# Patient Record
Sex: MALE | Race: WHITE
[De-identification: names, ages, dates, MRNs, and addresses within clinical notes are randomized per-mention and may not be internally consistent; named-entity substitution may affect disease eponyms.]

---

## 2020-08-22 ENCOUNTER — HOSPITAL ENCOUNTER (INPATIENT)
Dept: HOSPITAL 53 - M ED | Age: 50
LOS: 39 days | Discharge: HOME | DRG: 885 | End: 2020-09-30
Attending: PSYCHIATRY & NEUROLOGY | Admitting: PSYCHIATRY & NEUROLOGY
Payer: MEDICARE

## 2020-08-22 VITALS — BODY MASS INDEX: 36.58 KG/M2 | HEIGHT: 72 IN | WEIGHT: 270.07 LBS

## 2020-08-22 VITALS — DIASTOLIC BLOOD PRESSURE: 78 MMHG | SYSTOLIC BLOOD PRESSURE: 142 MMHG

## 2020-08-22 DIAGNOSIS — E66.9: ICD-10-CM

## 2020-08-22 DIAGNOSIS — K59.00: ICD-10-CM

## 2020-08-22 DIAGNOSIS — I10: ICD-10-CM

## 2020-08-22 DIAGNOSIS — R10.32: ICD-10-CM

## 2020-08-22 DIAGNOSIS — F20.9: Primary | ICD-10-CM

## 2020-08-22 DIAGNOSIS — E78.00: ICD-10-CM

## 2020-08-22 DIAGNOSIS — E11.9: ICD-10-CM

## 2020-08-22 DIAGNOSIS — Z79.84: ICD-10-CM

## 2020-08-22 DIAGNOSIS — Z79.899: ICD-10-CM

## 2020-08-22 DIAGNOSIS — E03.9: ICD-10-CM

## 2020-08-22 DIAGNOSIS — R31.9: ICD-10-CM

## 2020-08-22 LAB
APPEARANCE UR: CLEAR
BACTERIA UR QL AUTO: NEGATIVE
BILIRUB UR QL STRIP.AUTO: NEGATIVE
GLUCOSE UR QL STRIP.AUTO: NEGATIVE MG/DL
HGB UR QL STRIP.AUTO: (no result)
KETONES UR QL STRIP.AUTO: NEGATIVE MG/DL
LEUKOCYTE ESTERASE UR QL STRIP.AUTO: NEGATIVE
MUCOUS THREADS URNS QL MICRO: (no result)
NITRITE UR QL STRIP.AUTO: NEGATIVE
PH UR STRIP.AUTO: 6 UNITS (ref 5–9)
PROT UR QL STRIP.AUTO: NEGATIVE MG/DL
RBC # UR AUTO: 11 /HPF (ref 0–3)
SP GR UR STRIP.AUTO: 1.01 (ref 1–1.03)
SQUAMOUS #/AREA URNS AUTO: 0 /HPF (ref 0–6)
UROBILINOGEN UR QL STRIP.AUTO: 0.2 MG/DL (ref 0–2)
WBC #/AREA URNS AUTO: 1 /HPF (ref 0–3)

## 2020-08-22 RX ADMIN — PROPRANOLOL HYDROCHLORIDE SCH MG: 10 TABLET ORAL at 21:00

## 2020-08-22 RX ADMIN — ARIPIPRAZOLE SCH MG: 15 TABLET ORAL at 21:00

## 2020-08-22 RX ADMIN — METFORMIN HYDROCHLORIDE SCH MG: 500 TABLET, EXTENDED RELEASE ORAL at 18:00

## 2020-08-23 VITALS — SYSTOLIC BLOOD PRESSURE: 148 MMHG | DIASTOLIC BLOOD PRESSURE: 98 MMHG

## 2020-08-23 VITALS — SYSTOLIC BLOOD PRESSURE: 138 MMHG | DIASTOLIC BLOOD PRESSURE: 88 MMHG

## 2020-08-23 RX ADMIN — LEVOTHYROXINE SODIUM SCH MCG: 25 TABLET ORAL at 06:24

## 2020-08-23 RX ADMIN — ARIPIPRAZOLE SCH MG: 15 TABLET ORAL at 09:46

## 2020-08-23 RX ADMIN — IBUPROFEN PRN MG: 600 TABLET, FILM COATED ORAL at 07:34

## 2020-08-23 RX ADMIN — LOSARTAN POTASSIUM SCH MG: 25 TABLET, FILM COATED ORAL at 09:51

## 2020-08-23 RX ADMIN — PROPRANOLOL HYDROCHLORIDE SCH MG: 10 TABLET ORAL at 09:52

## 2020-08-23 RX ADMIN — ATORVASTATIN CALCIUM SCH MG: 20 TABLET, FILM COATED ORAL at 09:52

## 2020-08-23 RX ADMIN — ARIPIPRAZOLE SCH MG: 15 TABLET ORAL at 22:07

## 2020-08-23 RX ADMIN — PROPRANOLOL HYDROCHLORIDE SCH MG: 10 TABLET ORAL at 15:26

## 2020-08-23 RX ADMIN — PROPRANOLOL HYDROCHLORIDE SCH MG: 10 TABLET ORAL at 22:08

## 2020-08-23 RX ADMIN — METFORMIN HYDROCHLORIDE SCH MG: 500 TABLET, EXTENDED RELEASE ORAL at 07:04

## 2020-08-23 RX ADMIN — METFORMIN HYDROCHLORIDE SCH MG: 500 TABLET, EXTENDED RELEASE ORAL at 17:03

## 2020-08-23 NOTE — HPEPDOC
[facility] Medical History & Physical


Date of Admission


Aug 23, 2020


Date of Service:  Aug 23, 2020


Primary Care Physician:  A


Attending Physician:  ESTEFANY ALLEN MD





History and Physical


CHIEF COMPLAINT: Medical intake admission by hospitalist service. C/o recent L 

inguinal pain which has since resolved.





HISTORY OF PRESENT ILLNESS: 51 yo with a hx of HTN, hypothyroidism, DM2, 

admitted to [facility]. Hospitalist service consulted for medical intake. 





PAST MEDICAL HISTORY:


1. HTN


2. DM2


3. Hypothyroidism


4. Schizophrenia





PAST SURGICAL HISTORY:


Non contibutory





SOCIAL HISTORY:


denies smoking, eto use





FAMILY HISTORY:


denies





ALLERGIES: Please see below.





REVIEW OF SYSTEMS:


CONSTITUTIONAL: na.


HEENT: na.


CARDIOVASCULAR: na.


RESPIRATORY: na.


GASTROINTESTINAL: na. 


GENITOURINARY: na. C/o L inguinal pain which has resolved.


SKIN: na.


MUSCULOSKELETAL: na.


NEUROLOGICAL: na.


PSYCHIATRIC: na.


ENDOCRINE: na.


HEMATOLOGIC/LYMPHATIC: na.





HOME MEDICATIONS: Please see below. 





PHYSICAL EXAMINATION:


VITAL SIGNS: please see below


GENERAL APPEARANCE: NAD, comfortable standing. 


HEENT: PERRLA.


CARDIOVASCULAR: RRR, normal S1, S2.


LUNGS: CTAB.


ABDOMEN: obese abdomen, BS+, no pain to palpation. L inguinal area shows no 

swelling or masses on bearing down. No overlying skin changes. 


MUSCULOSKELETAL: normal ROM.


EXTREMITIES: no edema.


NEUROLOGICAL: no focal neuro deficits.


PSYCHIATRIC: calm, cooperative.





MICROBIOLOGY: Please see below. 





ASSESSMENT: 51 yo with a hx of HTN, hypothyroidism, DM2, admitted to [facility]. 

Hospitalist service consulted for medical intake. 





PLAN:


1. L inguinal pain: resolved. No hernia, MSK issues identified. Monitor


2. Hematuria on UA (8/22). Denies hx of trauma. Repeat UA. Outpatient urology 

follow up recommended for cystoscopy if hematuria persists. 


3. HTN: c/w home meds losartan 25 mg daily, propranolol 10 mg TID


4. hypothyroid: c/w home meds 25 mcg daily


5. constipation: bowel regimen


6. DM2: metformin. OK to continue if IV contrast imaging not planned.


7. Schizophrenia: per psychiatry





Thank you for the consult. Please re-consult as needed.





Vital Signs





Vital Signs








  Date Time  Temp Pulse Resp B/P (MAP) Pulse Ox O2 Delivery O2 Flow Rate FiO2


 


8/23/20 16:17 97.1 68 20 148/98 (115)    


 


8/23/20 07:43      Room Air  


 


8/22/20 22:59     95   











Laboratory Data


Labs 24H


Laboratory Tests 2


8/22/20 20:04: 


Urine Color YELLOW, Urine Appearance CLEAR, Urine pH 6.0, Urine Specific Gravity

1.010, Urine Protein NEGATIVE, Urine Glucose (Auto)(UA) NEGATIVE, Urine Ketones 

(Auto) NEGATIVE, Urine Blood 3+H, Urine Nitrite NEGATIVE, Urine Bilirubin 

NEGATIVE, Urine Urobilinogen 0.2, Urine Leukocyte Esterase (Auto) NEGATIVE, 

Urine WBC (Auto) 1, Urine RBC (Auto) 11H, Urine Hyaline Casts (Auto) 0, Urine 

Bacteria (Auto) NEGATIVE, Urine Squamous Epithelial Cells 0, Urine Mucus (Auto) 

SMALL, Urine Sperm (Auto)





Home Medications


Scheduled


Aripiprazole (Aripiprazole) 15 Mg Tablet, 15 MG PO BID


Atorvastatin Calcium (Atorvastatin Calcium) 20 Mg Tablet, 20 MG PO DAILY


Haloperidol (Haloperidol) 20 Mg Tablet, 20 MG PO QHS


Levothyroxine Sodium (Levothyroxine Sodium) 25 Mcg Tablet, 25 MCG PO DAILY


Losartan Potassium (Losartan Potassium) 25 Mg Tablet, 25 MG PO DAILY


Metformin HCl (Metformin HCl ER) 500 Mg Tab.er.24h, 500 MG PO BID


Propranolol HCl (Propranolol HCl) 10 Mg Tablet, 10 MG PO TID





Scheduled PRN


Diphenhydramine HCl (Diphenhydramine HCl) 50 Mg Capsule, 50 MG PO TID PRN for 

ANXIETY/AGITATION


Ibuprofen (Ibuprofen) 600 Mg Tablet, 1 MG PO TID PRN for PAIN


Zolpidem Tartrate (Ambien) 10 Mg Tablet, 10 MG PO QHS PRN for sleep





Allergies


Coded Allergies:  


     No Known Allergies (Unverified , 8/22/20)





A-FIB/CHADSVASC


A-FIB History


Current/History of A-Fib/PAF?:  No


Current PO Anticoag Therapy:  No











ESTEFANY ALLEN MD       Aug 23, 2020 20:08

## 2020-08-24 VITALS — DIASTOLIC BLOOD PRESSURE: 88 MMHG | SYSTOLIC BLOOD PRESSURE: 156 MMHG

## 2020-08-24 VITALS — SYSTOLIC BLOOD PRESSURE: 133 MMHG | DIASTOLIC BLOOD PRESSURE: 64 MMHG

## 2020-08-24 RX ADMIN — LEVOTHYROXINE SODIUM SCH MCG: 25 TABLET ORAL at 05:54

## 2020-08-24 RX ADMIN — LOSARTAN POTASSIUM SCH MG: 25 TABLET, FILM COATED ORAL at 09:36

## 2020-08-24 RX ADMIN — METFORMIN HYDROCHLORIDE SCH MG: 500 TABLET, EXTENDED RELEASE ORAL at 09:34

## 2020-08-24 RX ADMIN — ARIPIPRAZOLE SCH MG: 15 TABLET ORAL at 09:34

## 2020-08-24 RX ADMIN — ARIPIPRAZOLE SCH MG: 15 TABLET ORAL at 21:13

## 2020-08-24 RX ADMIN — PROPRANOLOL HYDROCHLORIDE SCH MG: 10 TABLET ORAL at 21:24

## 2020-08-24 RX ADMIN — PROPRANOLOL HYDROCHLORIDE SCH MG: 10 TABLET ORAL at 09:35

## 2020-08-24 RX ADMIN — METFORMIN HYDROCHLORIDE SCH MG: 500 TABLET, EXTENDED RELEASE ORAL at 16:40

## 2020-08-24 RX ADMIN — PROPRANOLOL HYDROCHLORIDE SCH MG: 10 TABLET ORAL at 16:39

## 2020-08-24 RX ADMIN — ATORVASTATIN CALCIUM SCH MG: 20 TABLET, FILM COATED ORAL at 09:34

## 2020-08-24 NOTE — MHIPNPDOC
[facility] Progress Note


Progress Note


DATE OF SERVICE: 8/24/20


Subjective


HPI:


Lowell presents today for a check-in. He states that he was brought in by the 

police and doesnt remember why or how he was brought in. He denies seeing 

anyone for mental health.


Objective


Appearance: Appears to be stated age. Poor hygeine.





Thought Form: Nonlinear and tangential.


Assessment


F20.89 Other schizophrenia


Plan


Continue medications


Attempt to contact outpatient once releases are obtained





Vital Signs





Vital Signs








  Date Time  Temp Pulse Resp B/P (MAP) Pulse Ox O2 Delivery O2 Flow Rate FiO2


 


8/24/20 09:36    133/64    


 


8/24/20 09:35  73      


 


8/24/20 08:40      Room Air  


 


8/24/20 06:38 98.4  14  95   











Current Medications





Current Medications








 Medications


  (Trade)  Dose


 Ordered  Sig/Heather


 Route


 PRN Reason  Start Time


 Stop Time Status Last Admin


Dose Admin


 


 Acetaminophen


  (Tylenol Tab)  650 mg  Q6HP  PRN


 PO


 HEADACHE or DISCOMFORT  8/22/20 18:30


   Cancel  





 


 Al Hydrox/Mg


 Hydrox/Simethicone


  (Mylanta)  30 ml  Q4HP  PRN


 PO


 HEARTBURN/INDIGESTION  8/22/20 18:30


     





 


 Aripiprazole


  (AbiLIFY)  15 mg  BID


 PO


   8/22/20 21:00


    8/24/20 09:34





 


 Atorvastatin


 Calcium


  (Lipitor)  20 mg  DAILY


 PO


   8/23/20 09:00


    8/24/20 09:34





 


 Diphenhydramine


 HCl


  (Benadryl)  50 mg  TID  PRN


 PO


 ANXIETY/AGITATION  8/22/20 20:00


     





 


 Haloperidol


  (Haldol)  20 mg  QHS


 PO


   8/22/20 21:00


    8/23/20 22:07





 


 Home Med


  (Med Rec


 Complete!)    ASDIRECTED


 XX


   8/22/20 19:00


 8/22/20 18:55 DC  





 


 Ibuprofen


  (Advil)  600 mg  TID  PRN


 PO


 PAIN  8/22/20 20:00


    8/23/20 07:34





 


 Levothyroxine


 Sodium


  (Synthroid)  25 mcg  DAILY@0600


 PO


   8/23/20 06:00


    8/24/20 05:54





 


 Losartan Potassium


  (Cozaar)  25 mg  DAILY


 PO


   8/23/20 09:00


    8/24/20 09:36





 


 Magnesium


 Hydroxide


  (Milk Of


 Magnesia)  30 ml  DAILYPRN  PRN


 PO


 CONSTIPATION  8/22/20 18:30


     





 


 Metformin HCl


  (Glucophage Xr)  500 mg  BID@0800,1800


 PO


   8/22/20 18:00


    8/24/20 09:34





 


 Propranolol HCl


  (Inderal)  10 mg  TID


 PO


   8/22/20 21:00


    8/24/20 09:35





 


 Trazodone HCl


  (Desyrel)  50 mg  QHSP  PRN


 PO


 INSOMNIA  8/22/20 18:30


   Cancel  





 


 Zolpidem Tartrate


  (Ambien)  10 mg  QHSP  PRN


 PO


 Insomnia  8/22/20 20:00


    8/23/20 22:06














Allergies


Coded Allergies:  


     No Known Allergies (Unverified , 8/22/20)











DEBRA MARQUEZ DO              Aug 24, 2020 11:38

## 2020-08-25 VITALS — SYSTOLIC BLOOD PRESSURE: 132 MMHG | DIASTOLIC BLOOD PRESSURE: 76 MMHG

## 2020-08-25 VITALS — SYSTOLIC BLOOD PRESSURE: 136 MMHG | DIASTOLIC BLOOD PRESSURE: 73 MMHG

## 2020-08-25 RX ADMIN — PROPRANOLOL HYDROCHLORIDE SCH MG: 10 TABLET ORAL at 16:41

## 2020-08-25 RX ADMIN — ARIPIPRAZOLE SCH MG: 15 TABLET ORAL at 09:24

## 2020-08-25 RX ADMIN — METFORMIN HYDROCHLORIDE SCH MG: 500 TABLET, EXTENDED RELEASE ORAL at 09:21

## 2020-08-25 RX ADMIN — LEVOTHYROXINE SODIUM SCH MCG: 25 TABLET ORAL at 06:03

## 2020-08-25 RX ADMIN — METFORMIN HYDROCHLORIDE SCH MG: 500 TABLET, EXTENDED RELEASE ORAL at 18:23

## 2020-08-25 RX ADMIN — ARIPIPRAZOLE SCH MG: 15 TABLET ORAL at 21:49

## 2020-08-25 RX ADMIN — PROPRANOLOL HYDROCHLORIDE SCH MG: 10 TABLET ORAL at 09:23

## 2020-08-25 RX ADMIN — LOSARTAN POTASSIUM SCH MG: 25 TABLET, FILM COATED ORAL at 09:24

## 2020-08-25 RX ADMIN — ATORVASTATIN CALCIUM SCH MG: 20 TABLET, FILM COATED ORAL at 09:24

## 2020-08-25 RX ADMIN — PROPRANOLOL HYDROCHLORIDE SCH MG: 10 TABLET ORAL at 21:51

## 2020-08-25 NOTE — MHIPNPDOC
[facility] Progress Note


Progress Note


DATE OF SERVICE: 8/25/20


Subjective


HPI:


Attempted to meet with patient today. He would only meet in the hallway. 

However, he stares blankly and was open to signing a release. However important 

that he wasnt yelling last night and otherwise would not answer questions.


Objective


Appearance: Poor hygiene.





Cognition: Psychotic.





Thought Form: Non-linear. Thought blocking present.





Judgement: Poor.





Insight: Poor.


Assessment


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 

condition


Plan


Continue home medications at this time.


Attempt to get release and speak to Dr. Russ to understand patients baseline.





Vital Signs





Vital Signs








  Date Time  Temp Pulse Resp B/P (MAP) Pulse Ox O2 Delivery O2 Flow Rate FiO2


 


8/25/20 06:11 98.3 70 14 136/73 (94) 95 Room Air  











Current Medications





Current Medications








 Medications


  (Trade)  Dose


 Ordered  Sig/Heather


 Route


 PRN Reason  Start Time


 Stop Time Status Last Admin


Dose Admin


 


 Acetaminophen


  (Tylenol Tab)  650 mg  Q6HP  PRN


 PO


 HEADACHE or DISCOMFORT  8/22/20 18:30


   Cancel  





 


 Al Hydrox/Mg


 Hydrox/Simethicone


  (Mylanta)  30 ml  Q4HP  PRN


 PO


 HEARTBURN/INDIGESTION  8/22/20 18:30


     





 


 Aripiprazole


  (AbiLIFY)  15 mg  BID


 PO


   8/22/20 21:00


    8/24/20 21:13





 


 Atorvastatin


 Calcium


  (Lipitor)  20 mg  DAILY


 PO


   8/23/20 09:00


    8/24/20 09:34





 


 Diphenhydramine


 HCl


  (Benadryl)  50 mg  TID  PRN


 PO


 ANXIETY/AGITATION  8/22/20 20:00


     





 


 Haloperidol


  (Haldol)  20 mg  QHS


 PO


   8/22/20 21:00


    8/24/20 21:13





 


 Home Med


  (Med Rec


 Complete!)    ASDIRECTED


 XX


   8/22/20 19:00


 8/22/20 18:55 DC  





 


 Ibuprofen


  (Advil)  600 mg  TID  PRN


 PO


 PAIN  8/22/20 20:00


    8/23/20 07:34





 


 Levothyroxine


 Sodium


  (Synthroid)  25 mcg  DAILY@0600


 PO


   8/23/20 06:00


    8/25/20 06:03





 


 Losartan Potassium


  (Cozaar)  25 mg  DAILY


 PO


   8/23/20 09:00


    8/24/20 09:36





 


 Magnesium


 Hydroxide


  (Milk Of


 Magnesia)  30 ml  DAILYPRN  PRN


 PO


 CONSTIPATION  8/22/20 18:30


     





 


 Metformin HCl


  (Glucophage Xr)  500 mg  BID@0800,1800


 PO


   8/22/20 18:00


    8/24/20 16:40





 


 Propranolol HCl


  (Inderal)  10 mg  TID


 PO


   8/22/20 21:00


    8/24/20 21:24





 


 Trazodone HCl


  (Desyrel)  50 mg  QHSP  PRN


 PO


 INSOMNIA  8/22/20 18:30


   Cancel  





 


 Zolpidem Tartrate


  (Ambien)  10 mg  QHSP  PRN


 PO


 Insomnia  8/22/20 20:00


    8/24/20 21:13














Allergies


Coded Allergies:  


     No Known Allergies (Unverified , 8/22/20)











DEBRA MARQUEZ DO              Aug 25, 2020 07:59

## 2020-08-26 VITALS — SYSTOLIC BLOOD PRESSURE: 160 MMHG | DIASTOLIC BLOOD PRESSURE: 81 MMHG

## 2020-08-26 VITALS — DIASTOLIC BLOOD PRESSURE: 74 MMHG | SYSTOLIC BLOOD PRESSURE: 138 MMHG

## 2020-08-26 VITALS — DIASTOLIC BLOOD PRESSURE: 90 MMHG | SYSTOLIC BLOOD PRESSURE: 174 MMHG

## 2020-08-26 RX ADMIN — LEVOTHYROXINE SODIUM SCH MCG: 25 TABLET ORAL at 06:52

## 2020-08-26 RX ADMIN — ATORVASTATIN CALCIUM SCH MG: 20 TABLET, FILM COATED ORAL at 09:48

## 2020-08-26 RX ADMIN — ARIPIPRAZOLE SCH MG: 15 TABLET ORAL at 09:48

## 2020-08-26 RX ADMIN — PROPRANOLOL HYDROCHLORIDE SCH MG: 10 TABLET ORAL at 09:48

## 2020-08-26 RX ADMIN — METFORMIN HYDROCHLORIDE SCH MG: 500 TABLET, EXTENDED RELEASE ORAL at 09:48

## 2020-08-26 RX ADMIN — ARIPIPRAZOLE SCH MG: 15 TABLET ORAL at 20:54

## 2020-08-26 RX ADMIN — PROPRANOLOL HYDROCHLORIDE SCH MG: 10 TABLET ORAL at 16:32

## 2020-08-26 RX ADMIN — METFORMIN HYDROCHLORIDE SCH MG: 500 TABLET, EXTENDED RELEASE ORAL at 17:26

## 2020-08-26 RX ADMIN — PROPRANOLOL HYDROCHLORIDE SCH MG: 10 TABLET ORAL at 21:09

## 2020-08-26 RX ADMIN — LOSARTAN POTASSIUM SCH MG: 25 TABLET, FILM COATED ORAL at 09:48

## 2020-08-26 NOTE — MHIPNPDOC
[facility] Progress Note


Progress Note


DATE OF SERVICE: 8/26/20


Subjective


HPI:


Lowell presents and was met with today after hospitalization. He is still quite 

distorted and psychotic, and sits in his chair refusing to meet with me in any 

more private setting. The patient is still tangential, however, the 

reports that they will be open to transferring him bed to bed a new group house.


Objective


Appearance: Poor.





Behavior: Talking to himself.





Thought Form: Nonlinear. Tangential. Psychotic.





Judgement: Poor.





Insight: Poor.


Assessment


F29 Unspecified psychosis not due to a substance or known physiological 

condition


Plan


We will attempt to coordinate paperwork for potential bed to bed transfer to a 

new group house.


We will continue current medications at this time.





Vital Signs





Vital Signs








  Date Time  Temp Pulse Resp B/P (MAP) Pulse Ox O2 Delivery O2 Flow Rate FiO2


 


8/26/20 09:48  82  138/74    


 


8/26/20 09:46   16     


 


8/26/20 06:31 98.3    91 Room Air  











Current Medications





Current Medications








 Medications


  (Trade)  Dose


 Ordered  Sig/Heather


 Route


 PRN Reason  Start Time


 Stop Time Status Last Admin


Dose Admin


 


 Acetaminophen


  (Tylenol Tab)  650 mg  Q6HP  PRN


 PO


 HEADACHE or DISCOMFORT  8/22/20 18:30


   Cancel  





 


 Al Hydrox/Mg


 Hydrox/Simethicone


  (Mylanta)  30 ml  Q4HP  PRN


 PO


 HEARTBURN/INDIGESTION  8/22/20 18:30


     





 


 Aripiprazole


  (AbiLIFY)  15 mg  BID


 PO


   8/22/20 21:00


    8/26/20 09:48





 


 Atorvastatin


 Calcium


  (Lipitor)  20 mg  DAILY


 PO


   8/23/20 09:00


    8/26/20 09:48





 


 Diphenhydramine


 HCl


  (Benadryl)  50 mg  TID  PRN


 PO


 ANXIETY/AGITATION  8/22/20 20:00


     





 


 Haloperidol


  (Haldol)  20 mg  QHS


 PO


   8/22/20 21:00


    8/25/20 21:49





 


 Home Med


  (Med Rec


 Complete!)    ASDIRECTED


 XX


   8/22/20 19:00


 8/22/20 18:55 DC  





 


 Ibuprofen


  (Advil)  600 mg  TID  PRN


 PO


 PAIN  8/22/20 20:00


    8/23/20 07:34





 


 Levothyroxine


 Sodium


  (Synthroid)  25 mcg  DAILY@0600


 PO


   8/23/20 06:00


    8/26/20 06:52





 


 Losartan Potassium


  (Cozaar)  25 mg  DAILY


 PO


   8/23/20 09:00


    8/26/20 09:48





 


 Magnesium


 Hydroxide


  (Milk Of


 Magnesia)  30 ml  DAILYPRN  PRN


 PO


 CONSTIPATION  8/22/20 18:30


     





 


 Metformin HCl


  (Glucophage Xr)  500 mg  BID@0800,1800


 PO


   8/22/20 18:00


    8/26/20 09:48





 


 Propranolol HCl


  (Inderal)  10 mg  TID


 PO


   8/22/20 21:00


    8/26/20 09:48





 


 Trazodone HCl


  (Desyrel)  50 mg  QHSP  PRN


 PO


 INSOMNIA  8/22/20 18:30


   Cancel  





 


 Zolpidem Tartrate


  (Ambien)  10 mg  QHSP  PRN


 PO


 Insomnia  8/22/20 20:00


    8/25/20 21:49














Allergies


Coded Allergies:  


     No Known Allergies (Unverified , 8/22/20)











DEBRA MARQUEZ DO              Aug 26, 2020 11:46

## 2020-08-27 VITALS — SYSTOLIC BLOOD PRESSURE: 142 MMHG | DIASTOLIC BLOOD PRESSURE: 78 MMHG

## 2020-08-27 VITALS — SYSTOLIC BLOOD PRESSURE: 132 MMHG | DIASTOLIC BLOOD PRESSURE: 73 MMHG

## 2020-08-27 RX ADMIN — METFORMIN HYDROCHLORIDE SCH MG: 500 TABLET, EXTENDED RELEASE ORAL at 17:00

## 2020-08-27 RX ADMIN — ARIPIPRAZOLE SCH MG: 15 TABLET ORAL at 08:10

## 2020-08-27 RX ADMIN — ARIPIPRAZOLE SCH MG: 15 TABLET ORAL at 19:59

## 2020-08-27 RX ADMIN — PROPRANOLOL HYDROCHLORIDE SCH MG: 10 TABLET ORAL at 17:01

## 2020-08-27 RX ADMIN — ATORVASTATIN CALCIUM SCH MG: 20 TABLET, FILM COATED ORAL at 08:08

## 2020-08-27 RX ADMIN — METFORMIN HYDROCHLORIDE SCH MG: 500 TABLET, EXTENDED RELEASE ORAL at 08:07

## 2020-08-27 RX ADMIN — LEVOTHYROXINE SODIUM SCH MCG: 25 TABLET ORAL at 05:57

## 2020-08-27 RX ADMIN — LOSARTAN POTASSIUM SCH MG: 25 TABLET, FILM COATED ORAL at 08:07

## 2020-08-27 RX ADMIN — PROPRANOLOL HYDROCHLORIDE SCH MG: 10 TABLET ORAL at 08:09

## 2020-08-27 RX ADMIN — PROPRANOLOL HYDROCHLORIDE SCH MG: 10 TABLET ORAL at 19:59

## 2020-08-27 NOTE — MHIPNPDOC
[facility] Progress Note


Progress Note


DATE OF SERVICE: 8/27/20


Subjective


HPI:


Lowell attempted to be met with today. He is still quite distorted and was 

talking to himself in his room, apparently quite internally preoccupied. He 

stated no violence or other issues overnight.


Objective


Thought Form: Nonlinear. Talking to himself. Highly internally preoccupied.





Judgement: Poor.





Insight: Poor.


Assessment


F29 Unspecified psychosis not due to a substance or known physiological 

condition


Plan


Continue application package for group house per notes. It appears that the 

Patient is psychotic at baseline.


Will attempt to contact outpatient provider to get more insight. However, likely

group home would be more helpful.


His complex medication regimen will be continued. I am very ambivalent about 

changing it at this time as it is likely that decompensation could be provoked.





Vital Signs





Vital Signs








  Date Time  Temp Pulse Resp B/P (MAP) Pulse Ox O2 Delivery O2 Flow Rate FiO2


 


8/27/20 05:51 98.3 65 18 132/73 (92)  Room Air  


 


8/26/20 16:03     96   











Current Medications





Current Medications








 Medications


  (Trade)  Dose


 Ordered  Sig/Heather


 Route


 PRN Reason  Start Time


 Stop Time Status Last Admin


Dose Admin


 


 Acetaminophen


  (Tylenol Tab)  650 mg  Q6HP  PRN


 PO


 HEADACHE or DISCOMFORT  8/22/20 18:30


   Cancel  





 


 Al Hydrox/Mg


 Hydrox/Simethicone


  (Mylanta)  30 ml  Q4HP  PRN


 PO


 HEARTBURN/INDIGESTION  8/22/20 18:30


     





 


 Aripiprazole


  (AbiLIFY)  15 mg  BID


 PO


   8/22/20 21:00


    8/26/20 20:54





 


 Atorvastatin


 Calcium


  (Lipitor)  20 mg  DAILY


 PO


   8/23/20 09:00


    8/26/20 09:48





 


 Diphenhydramine


 HCl


  (Benadryl)  50 mg  TID  PRN


 PO


 ANXIETY/AGITATION  8/22/20 20:00


     





 


 Haloperidol


  (Haldol)  20 mg  QHS


 PO


   8/22/20 21:00


    8/26/20 21:08





 


 Home Med


  (Med Rec


 Complete!)    ASDIRECTED


 XX


   8/22/20 19:00


 8/22/20 18:55 DC  





 


 Ibuprofen


  (Advil)  600 mg  TID  PRN


 PO


 PAIN  8/22/20 20:00


    8/23/20 07:34





 


 Levothyroxine


 Sodium


  (Synthroid)  25 mcg  DAILY@0600


 PO


   8/23/20 06:00


    8/27/20 05:57





 


 Losartan Potassium


  (Cozaar)  25 mg  DAILY


 PO


   8/23/20 09:00


    8/26/20 09:48





 


 Magnesium


 Hydroxide


  (Milk Of


 Magnesia)  30 ml  DAILYPRN  PRN


 PO


 CONSTIPATION  8/22/20 18:30


     





 


 Metformin HCl


  (Glucophage Xr)  500 mg  BID@0800,1800


 PO


   8/22/20 18:00


    8/26/20 17:26





 


 Propranolol HCl


  (Inderal)  10 mg  TID


 PO


   8/22/20 21:00


    8/26/20 21:09





 


 Trazodone HCl


  (Desyrel)  50 mg  QHSP  PRN


 PO


 INSOMNIA  8/22/20 18:30


   Cancel  





 


 Zolpidem Tartrate


  (Ambien)  10 mg  QHSP  PRN


 PO


 Insomnia  8/22/20 20:00


    8/26/20 21:09














Allergies


Coded Allergies:  


     No Known Allergies (Unverified , 8/22/20)











DEBRA MARQUEZ DO              Aug 27, 2020 07:36

## 2020-08-28 VITALS — DIASTOLIC BLOOD PRESSURE: 92 MMHG | SYSTOLIC BLOOD PRESSURE: 176 MMHG

## 2020-08-28 VITALS — SYSTOLIC BLOOD PRESSURE: 138 MMHG | DIASTOLIC BLOOD PRESSURE: 77 MMHG

## 2020-08-28 RX ADMIN — PROPRANOLOL HYDROCHLORIDE SCH MG: 10 TABLET ORAL at 15:04

## 2020-08-28 RX ADMIN — DIPHENHYDRAMINE HYDROCHLORIDE PRN MG: 50 CAPSULE ORAL at 02:55

## 2020-08-28 RX ADMIN — PROPRANOLOL HYDROCHLORIDE SCH MG: 10 TABLET ORAL at 20:53

## 2020-08-28 RX ADMIN — METFORMIN HYDROCHLORIDE SCH MG: 500 TABLET, EXTENDED RELEASE ORAL at 09:27

## 2020-08-28 RX ADMIN — METFORMIN HYDROCHLORIDE SCH MG: 500 TABLET, EXTENDED RELEASE ORAL at 17:08

## 2020-08-28 RX ADMIN — LEVOTHYROXINE SODIUM SCH MCG: 25 TABLET ORAL at 05:46

## 2020-08-28 RX ADMIN — ARIPIPRAZOLE SCH MG: 15 TABLET ORAL at 09:29

## 2020-08-28 RX ADMIN — ATORVASTATIN CALCIUM SCH MG: 20 TABLET, FILM COATED ORAL at 09:28

## 2020-08-28 RX ADMIN — PROPRANOLOL HYDROCHLORIDE SCH MG: 10 TABLET ORAL at 09:28

## 2020-08-28 RX ADMIN — ARIPIPRAZOLE SCH MG: 15 TABLET ORAL at 20:52

## 2020-08-28 RX ADMIN — LOSARTAN POTASSIUM SCH MG: 25 TABLET, FILM COATED ORAL at 09:29

## 2020-08-28 NOTE — MHIPNPDOC
[facility] Progress Note


Progress Note


DATE OF SERVICE: 8/28/20


Subjective


HPI:


Patient presents today for follow-up, and attempted to be met today. However, he

is still quite distorted, unable to reason, and attempted to discuss with 

patient about potential group house placement, but patient is unable to engage 

in a reasonable interview.


Objective


Appearance: Poor hygiene.





Thought Form: Nonlinear. Psychotic. Thought blocking present.





Judgement: Poor.





Insight: Poor.


Assessment


F20.89 Other schizophrenia


Plan


Continue with pursuing group house placement.


Continue medications as is, with no changes.





Vital Signs





Vital Signs








  Date Time  Temp Pulse Resp B/P (MAP) Pulse Ox O2 Delivery O2 Flow Rate FiO2


 


8/28/20 06:20 98.6 75 18 138/77 (97) 94 Room Air  











Current Medications





Current Medications








 Medications


  (Trade)  Dose


 Ordered  Sig/Heather


 Route


 PRN Reason  Start Time


 Stop Time Status Last Admin


Dose Admin


 


 Acetaminophen


  (Tylenol Tab)  650 mg  Q6HP  PRN


 PO


 HEADACHE or DISCOMFORT  8/22/20 18:30


   Cancel  





 


 Al Hydrox/Mg


 Hydrox/Simethicone


  (Mylanta)  30 ml  Q4HP  PRN


 PO


 HEARTBURN/INDIGESTION  8/22/20 18:30


     





 


 Aripiprazole


  (AbiLIFY)  15 mg  BID


 PO


   8/22/20 21:00


    8/27/20 19:59





 


 Atorvastatin


 Calcium


  (Lipitor)  20 mg  DAILY


 PO


   8/23/20 09:00


    8/27/20 08:08





 


 Diphenhydramine


 HCl


  (Benadryl)  50 mg  TID  PRN


 PO


 ANXIETY/AGITATION  8/22/20 20:00


    8/28/20 02:55





 


 Haloperidol


  (Haldol)  20 mg  QHS


 PO


   8/22/20 21:00


    8/27/20 20:00





 


 Home Med


  (Med Rec


 Complete!)    ASDIRECTED


 XX


   8/22/20 19:00


 8/22/20 18:55 DC  





 


 Ibuprofen


  (Advil)  600 mg  TID  PRN


 PO


 PAIN  8/22/20 20:00


    8/23/20 07:34





 


 Levothyroxine


 Sodium


  (Synthroid)  25 mcg  DAILY@0600


 PO


   8/23/20 06:00


    8/28/20 05:46





 


 Losartan Potassium


  (Cozaar)  25 mg  DAILY


 PO


   8/23/20 09:00


    8/27/20 08:07





 


 Magnesium


 Hydroxide


  (Milk Of


 Magnesia)  30 ml  DAILYPRN  PRN


 PO


 CONSTIPATION  8/22/20 18:30


     





 


 Metformin HCl


  (Glucophage Xr)  500 mg  BID@0800,1800


 PO


   8/22/20 18:00


    8/27/20 17:00





 


 Propranolol HCl


  (Inderal)  10 mg  TID


 PO


   8/22/20 21:00


    8/27/20 19:59





 


 Trazodone HCl


  (Desyrel)  50 mg  QHSP  PRN


 PO


 INSOMNIA  8/22/20 18:30


   Cancel  





 


 Zolpidem Tartrate


  (Ambien)  10 mg  QHSP  PRN


 PO


 Insomnia  8/22/20 20:00


    8/27/20 19:58














Allergies


Coded Allergies:  


     No Known Allergies (Unverified , 8/22/20)











DEBRA MARQUEZ DO              Aug 28, 2020 08:18

## 2020-08-29 VITALS — SYSTOLIC BLOOD PRESSURE: 124 MMHG | DIASTOLIC BLOOD PRESSURE: 90 MMHG

## 2020-08-29 RX ADMIN — PROPRANOLOL HYDROCHLORIDE SCH MG: 10 TABLET ORAL at 16:52

## 2020-08-29 RX ADMIN — LOSARTAN POTASSIUM SCH MG: 25 TABLET, FILM COATED ORAL at 08:09

## 2020-08-29 RX ADMIN — ARIPIPRAZOLE SCH MG: 15 TABLET ORAL at 21:48

## 2020-08-29 RX ADMIN — METFORMIN HYDROCHLORIDE SCH MG: 500 TABLET, EXTENDED RELEASE ORAL at 08:08

## 2020-08-29 RX ADMIN — ARIPIPRAZOLE SCH MG: 15 TABLET ORAL at 08:09

## 2020-08-29 RX ADMIN — LEVOTHYROXINE SODIUM SCH MCG: 25 TABLET ORAL at 05:56

## 2020-08-29 RX ADMIN — METFORMIN HYDROCHLORIDE SCH MG: 500 TABLET, EXTENDED RELEASE ORAL at 17:00

## 2020-08-29 RX ADMIN — ATORVASTATIN CALCIUM SCH MG: 20 TABLET, FILM COATED ORAL at 08:09

## 2020-08-29 RX ADMIN — PROPRANOLOL HYDROCHLORIDE SCH MG: 10 TABLET ORAL at 21:48

## 2020-08-29 RX ADMIN — DIPHENHYDRAMINE HYDROCHLORIDE PRN MG: 50 CAPSULE ORAL at 21:50

## 2020-08-29 RX ADMIN — PROPRANOLOL HYDROCHLORIDE SCH MG: 10 TABLET ORAL at 08:08

## 2020-08-30 VITALS — SYSTOLIC BLOOD PRESSURE: 110 MMHG | DIASTOLIC BLOOD PRESSURE: 65 MMHG

## 2020-08-30 VITALS — DIASTOLIC BLOOD PRESSURE: 76 MMHG | SYSTOLIC BLOOD PRESSURE: 156 MMHG

## 2020-08-30 RX ADMIN — ARIPIPRAZOLE SCH MG: 15 TABLET ORAL at 08:21

## 2020-08-30 RX ADMIN — LEVOTHYROXINE SODIUM SCH MCG: 25 TABLET ORAL at 06:10

## 2020-08-30 RX ADMIN — METFORMIN HYDROCHLORIDE SCH MG: 500 TABLET, EXTENDED RELEASE ORAL at 16:48

## 2020-08-30 RX ADMIN — ATORVASTATIN CALCIUM SCH MG: 20 TABLET, FILM COATED ORAL at 08:22

## 2020-08-30 RX ADMIN — PROPRANOLOL HYDROCHLORIDE SCH MG: 10 TABLET ORAL at 15:09

## 2020-08-30 RX ADMIN — ARIPIPRAZOLE SCH MG: 15 TABLET ORAL at 21:31

## 2020-08-30 RX ADMIN — METFORMIN HYDROCHLORIDE SCH MG: 500 TABLET, EXTENDED RELEASE ORAL at 08:21

## 2020-08-30 RX ADMIN — LOSARTAN POTASSIUM SCH MG: 25 TABLET, FILM COATED ORAL at 08:21

## 2020-08-30 RX ADMIN — PROPRANOLOL HYDROCHLORIDE SCH MG: 10 TABLET ORAL at 21:32

## 2020-08-30 RX ADMIN — PROPRANOLOL HYDROCHLORIDE SCH MG: 10 TABLET ORAL at 08:22

## 2020-08-31 VITALS — SYSTOLIC BLOOD PRESSURE: 138 MMHG | DIASTOLIC BLOOD PRESSURE: 87 MMHG

## 2020-08-31 VITALS — SYSTOLIC BLOOD PRESSURE: 138 MMHG | DIASTOLIC BLOOD PRESSURE: 68 MMHG

## 2020-08-31 RX ADMIN — PROPRANOLOL HYDROCHLORIDE SCH MG: 10 TABLET ORAL at 21:13

## 2020-08-31 RX ADMIN — PROPRANOLOL HYDROCHLORIDE SCH MG: 10 TABLET ORAL at 09:10

## 2020-08-31 RX ADMIN — LOSARTAN POTASSIUM SCH MG: 25 TABLET, FILM COATED ORAL at 09:10

## 2020-08-31 RX ADMIN — LEVOTHYROXINE SODIUM SCH MCG: 25 TABLET ORAL at 06:11

## 2020-08-31 RX ADMIN — METFORMIN HYDROCHLORIDE SCH MG: 500 TABLET, EXTENDED RELEASE ORAL at 09:10

## 2020-08-31 RX ADMIN — ARIPIPRAZOLE SCH MG: 15 TABLET ORAL at 09:10

## 2020-08-31 RX ADMIN — PROPRANOLOL HYDROCHLORIDE SCH MG: 10 TABLET ORAL at 15:04

## 2020-08-31 RX ADMIN — ATORVASTATIN CALCIUM SCH MG: 20 TABLET, FILM COATED ORAL at 09:10

## 2020-08-31 RX ADMIN — ARIPIPRAZOLE SCH MG: 15 TABLET ORAL at 21:12

## 2020-08-31 RX ADMIN — METFORMIN HYDROCHLORIDE SCH MG: 500 TABLET, EXTENDED RELEASE ORAL at 17:12

## 2020-09-01 VITALS — SYSTOLIC BLOOD PRESSURE: 168 MMHG | DIASTOLIC BLOOD PRESSURE: 89 MMHG

## 2020-09-01 VITALS — SYSTOLIC BLOOD PRESSURE: 126 MMHG | DIASTOLIC BLOOD PRESSURE: 82 MMHG

## 2020-09-01 RX ADMIN — PROPRANOLOL HYDROCHLORIDE SCH MG: 10 TABLET ORAL at 08:50

## 2020-09-01 RX ADMIN — PROPRANOLOL HYDROCHLORIDE SCH MG: 10 TABLET ORAL at 16:03

## 2020-09-01 RX ADMIN — LOSARTAN POTASSIUM SCH MG: 25 TABLET, FILM COATED ORAL at 08:49

## 2020-09-01 RX ADMIN — DIPHENHYDRAMINE HYDROCHLORIDE PRN MG: 50 CAPSULE ORAL at 00:18

## 2020-09-01 RX ADMIN — METFORMIN HYDROCHLORIDE SCH MG: 500 TABLET, EXTENDED RELEASE ORAL at 08:50

## 2020-09-01 RX ADMIN — LEVOTHYROXINE SODIUM SCH MCG: 25 TABLET ORAL at 05:51

## 2020-09-01 RX ADMIN — ATORVASTATIN CALCIUM SCH MG: 20 TABLET, FILM COATED ORAL at 08:49

## 2020-09-01 RX ADMIN — ARIPIPRAZOLE SCH MG: 15 TABLET ORAL at 20:40

## 2020-09-01 RX ADMIN — METFORMIN HYDROCHLORIDE SCH MG: 500 TABLET, EXTENDED RELEASE ORAL at 17:50

## 2020-09-01 RX ADMIN — ARIPIPRAZOLE SCH MG: 15 TABLET ORAL at 08:49

## 2020-09-01 RX ADMIN — PROPRANOLOL HYDROCHLORIDE SCH MG: 10 TABLET ORAL at 20:41

## 2020-09-01 RX ADMIN — DIPHENHYDRAMINE HYDROCHLORIDE PRN MG: 50 CAPSULE ORAL at 20:41

## 2020-09-01 NOTE — MHIPNPDOC
[facility] Progress Note


Progress Note


DATE OF SERVICE: 9/1/20


Subjective


HPI:


Lowell is met with today and is still distorted and psychotic without any further

insight. Patient is laying in his bedroom. He has had no agiation but is noted 

to talk to self at times. 


Objective


Appearance: Poor hygiene.





Speech: Minimal speech.





Thought Form: Thought process is tangential. Tangential thought process.





Judgement: Poor judgement.





Insight: Poor insight.


Assessment


F20.9 Schizophrenia, unspecified


Plan


Continue patients current medications.


Continue to pursue the group house option as it appears that this would be the 

safest discharge for him.


Still concerned about changing medications too quickly as this could participate

a relapse.





Vital Signs





Vital Signs








  Date Time  Temp Pulse Resp B/P (MAP) Pulse Ox O2 Delivery O2 Flow Rate FiO2


 


9/1/20 08:49    126/82    


 


9/1/20 06:25 97.4 66 18     


 


8/30/20 18:25     95 Room Air  











Current Medications





Current Medications








 Medications


  (Trade)  Dose


 Ordered  Sig/Heather


 Route


 PRN Reason  Start Time


 Stop Time Status Last Admin


Dose Admin


 


 Acetaminophen


  (Tylenol Tab)  650 mg  Q6HP  PRN


 PO


 HEADACHE or DISCOMFORT  8/22/20 18:30


   Cancel  





 


 Al Hydrox/Mg


 Hydrox/Simethicone


  (Mylanta)  30 ml  Q4HP  PRN


 PO


 HEARTBURN/INDIGESTION  8/22/20 18:30


     





 


 Aripiprazole


  (AbiLIFY)  15 mg  BID


 PO


   8/22/20 21:00


    9/1/20 08:49





 


 Atorvastatin


 Calcium


  (Lipitor)  20 mg  DAILY


 PO


   8/23/20 09:00


    9/1/20 08:49





 


 Diphenhydramine


 HCl


  (Benadryl)  50 mg  TID  PRN


 PO


 ANXIETY/AGITATION  8/22/20 20:00


    9/1/20 00:18





 


 Haloperidol


  (Haldol)  20 mg  QHS


 PO


   8/22/20 21:00


    8/31/20 21:12





 


 Home Med


  (Med Rec


 Complete!)    ASDIRECTED


 XX


   8/22/20 19:00


 8/22/20 18:55 DC  





 


 Ibuprofen


  (Advil)  600 mg  TID  PRN


 PO


 PAIN  8/22/20 20:00


    8/23/20 07:34





 


 Levothyroxine


 Sodium


  (Synthroid)  25 mcg  DAILY@0600


 PO


   8/23/20 06:00


    9/1/20 05:51





 


 Losartan Potassium


  (Cozaar)  25 mg  DAILY


 PO


   8/23/20 09:00


    9/1/20 08:49





 


 Magnesium


 Hydroxide


  (Milk Of


 Magnesia)  30 ml  DAILYPRN  PRN


 PO


 CONSTIPATION  8/22/20 18:30


     





 


 Metformin HCl


  (Glucophage Xr)  500 mg  BID@0800,1800


 PO


   8/22/20 18:00


    9/1/20 08:50





 


 Propranolol HCl


  (Inderal)  10 mg  TID


 PO


   8/22/20 21:00


    9/1/20 08:50





 


 Trazodone HCl


  (Desyrel)  50 mg  QHSP  PRN


 PO


 INSOMNIA  8/22/20 18:30


   Cancel  





 


 Zolpidem Tartrate


  (Ambien)  10 mg  QHSP  PRN


 PO


 Insomnia  8/22/20 20:00


 8/29/20 19:59 DC 8/28/20 20:51














Allergies


Coded Allergies:  


     No Known Allergies (Unverified , 8/22/20)











DEBRA MARQUEZ DO               Sep 1, 2020 11:00

## 2020-09-02 VITALS — DIASTOLIC BLOOD PRESSURE: 96 MMHG | SYSTOLIC BLOOD PRESSURE: 138 MMHG

## 2020-09-02 VITALS — SYSTOLIC BLOOD PRESSURE: 140 MMHG | DIASTOLIC BLOOD PRESSURE: 88 MMHG

## 2020-09-02 RX ADMIN — METFORMIN HYDROCHLORIDE SCH MG: 500 TABLET, EXTENDED RELEASE ORAL at 08:19

## 2020-09-02 RX ADMIN — LEVOTHYROXINE SODIUM SCH MCG: 25 TABLET ORAL at 05:03

## 2020-09-02 RX ADMIN — LOSARTAN POTASSIUM SCH MG: 25 TABLET, FILM COATED ORAL at 08:19

## 2020-09-02 RX ADMIN — ARIPIPRAZOLE SCH MG: 15 TABLET ORAL at 21:37

## 2020-09-02 RX ADMIN — DIPHENHYDRAMINE HYDROCHLORIDE PRN MG: 50 CAPSULE ORAL at 21:35

## 2020-09-02 RX ADMIN — PROPRANOLOL HYDROCHLORIDE SCH MG: 10 TABLET ORAL at 21:36

## 2020-09-02 RX ADMIN — ATORVASTATIN CALCIUM SCH MG: 20 TABLET, FILM COATED ORAL at 08:18

## 2020-09-02 RX ADMIN — METFORMIN HYDROCHLORIDE SCH MG: 500 TABLET, EXTENDED RELEASE ORAL at 17:16

## 2020-09-02 RX ADMIN — PROPRANOLOL HYDROCHLORIDE SCH MG: 10 TABLET ORAL at 08:18

## 2020-09-02 RX ADMIN — ARIPIPRAZOLE SCH MG: 15 TABLET ORAL at 08:18

## 2020-09-02 RX ADMIN — DIPHENHYDRAMINE HYDROCHLORIDE PRN MG: 50 CAPSULE ORAL at 02:33

## 2020-09-02 RX ADMIN — PROPRANOLOL HYDROCHLORIDE SCH MG: 10 TABLET ORAL at 16:00

## 2020-09-02 NOTE — MHIPNPDOC
[facility] Progress Note


Progress Note


DATE OF SERVICE: 9/2/20


Subjective


HPI:


Lowell presents today for psychosis. Patient was met with today. However, he is 

still at what appears to be a baseline level of psychosis. He is noted to talk 

to himself from time to time. Patient has been up and out of bed, engaging at 

mealtimes. He denies any problems from his medications. Patient still states 

that he wants to go back to [facility] and is still not going to a group 

home.


Objective


Appearance: Appears to be stated age. Baseline hygiene. Well nourished.





Thought Form: Mildly tangential, able to follow basic conversation.





Thought Content: No evidence of suicidal ideation. No evidence of aggressive or 

homicidal ideation. No evidence of delusions. No thoughts of self harm.





Judgement: Poor at baseline.





Insight: Poor at baseline.


Assessment


F20.9 Schizophrenia, unspecified


Plan


Continue medications as current.


Will explore the possibility of a group home. However, it does not appear that 

he would be able to be forced into this.





Vital Signs





Vital Signs








  Date Time  Temp Pulse Resp B/P (MAP) Pulse Ox O2 Delivery O2 Flow Rate FiO2


 


9/2/20 08:19    138/96    


 


9/2/20 08:18  70      


 


9/2/20 06:41 97.8  18   Room Air  


 


8/30/20 18:25     95   











Laboratory Data


24[facility] Labs


Laboratory Tests 2


9/1/20 21:05: Coronavirus (COVID-19)(PCR) NEGATIVE





Current Medications





Current Medications








 Medications


  (Trade)  Dose


 Ordered  Sig/Heather


 Route


 PRN Reason  Start Time


 Stop Time Status Last Admin


Dose Admin


 


 Acetaminophen


  (Tylenol Tab)  650 mg  Q6HP  PRN


 PO


 HEADACHE or DISCOMFORT  8/22/20 18:30


   Cancel  





 


 Al Hydrox/Mg


 Hydrox/Simethicone


  (Mylanta)  30 ml  Q4HP  PRN


 PO


 HEARTBURN/INDIGESTION  8/22/20 18:30


     





 


 Aripiprazole


  (AbiLIFY)  15 mg  BID


 PO


   8/22/20 21:00


    9/2/20 08:18





 


 Atorvastatin


 Calcium


  (Lipitor)  20 mg  DAILY


 PO


   8/23/20 09:00


    9/2/20 08:18





 


 Diphenhydramine


 HCl


  (Benadryl)  50 mg  TID  PRN


 PO


 ANXIETY/AGITATION  8/22/20 20:00


    9/2/20 02:33





 


 Haloperidol


  (Haldol)  20 mg  QHS


 PO


   8/22/20 21:00


    9/1/20 20:41





 


 Home Med


  (Med Rec


 Complete!)    ASDIRECTED


 XX


   8/22/20 19:00


 8/22/20 18:55 DC  





 


 Ibuprofen


  (Advil)  600 mg  TID  PRN


 PO


 PAIN  8/22/20 20:00


    8/23/20 07:34





 


 Levothyroxine


 Sodium


  (Synthroid)  25 mcg  DAILY@0600


 PO


   8/23/20 06:00


    9/2/20 05:03





 


 Losartan Potassium


  (Cozaar)  25 mg  DAILY


 PO


   8/23/20 09:00


    9/2/20 08:19





 


 Magnesium


 Hydroxide


  (Milk Of


 Magnesia)  30 ml  DAILYPRN  PRN


 PO


 CONSTIPATION  8/22/20 18:30


     





 


 Metformin HCl


  (Glucophage Xr)  500 mg  BID@0800,1800


 PO


   8/22/20 18:00


    9/2/20 08:19





 


 Non-Formulary


 Medication


  (** See Comment


 Field Below **)  SEE


 COMMENTS


 SECTION  1T@10


 XX


   9/3/20 10:00


 9/1/20 12:33 DC  





 


 Non-Formulary


 Medication


  (** See Comment


 Field Below **)  SEE LABEL


 COMMENTS  DAILY


 XX


   9/1/20 09:00


 9/1/20 13:42 DC  





 


 Non-Formulary


 Medication


  (** See Comment


 Field Below **)  SEE LABEL


 COMMENTS  DAILY


 XX


   9/2/20 09:00


     





 


 Propranolol HCl


  (Inderal)  10 mg  TID


 PO


   8/22/20 21:00


    9/2/20 08:18





 


 Trazodone HCl


  (Desyrel)  50 mg  QHSP  PRN


 PO


 INSOMNIA  8/22/20 18:30


   Cancel  





 


 Zolpidem Tartrate


  (Ambien)  10 mg  QHSP  PRN


 PO


 Insomnia  8/22/20 20:00


 8/29/20 19:59 DC 8/28/20 20:51














Allergies


Coded Allergies:  


     No Known Allergies (Unverified , 8/22/20)











DEBRA MARQUEZ DO               Sep 2, 2020 08:52

## 2020-09-03 VITALS — DIASTOLIC BLOOD PRESSURE: 71 MMHG | SYSTOLIC BLOOD PRESSURE: 111 MMHG

## 2020-09-03 VITALS — SYSTOLIC BLOOD PRESSURE: 117 MMHG | DIASTOLIC BLOOD PRESSURE: 68 MMHG

## 2020-09-03 RX ADMIN — ATORVASTATIN CALCIUM SCH MG: 20 TABLET, FILM COATED ORAL at 09:27

## 2020-09-03 RX ADMIN — LOSARTAN POTASSIUM SCH MG: 25 TABLET, FILM COATED ORAL at 09:27

## 2020-09-03 RX ADMIN — METFORMIN HYDROCHLORIDE SCH MG: 500 TABLET, EXTENDED RELEASE ORAL at 09:27

## 2020-09-03 RX ADMIN — LEVOTHYROXINE SODIUM SCH MCG: 25 TABLET ORAL at 06:03

## 2020-09-03 RX ADMIN — PROPRANOLOL HYDROCHLORIDE SCH MG: 10 TABLET ORAL at 09:28

## 2020-09-03 RX ADMIN — ARIPIPRAZOLE SCH MG: 15 TABLET ORAL at 21:41

## 2020-09-03 RX ADMIN — PROPRANOLOL HYDROCHLORIDE SCH MG: 10 TABLET ORAL at 21:41

## 2020-09-03 RX ADMIN — DIPHENHYDRAMINE HYDROCHLORIDE PRN MG: 50 CAPSULE ORAL at 02:44

## 2020-09-03 RX ADMIN — METFORMIN HYDROCHLORIDE SCH MG: 500 TABLET, EXTENDED RELEASE ORAL at 18:52

## 2020-09-03 RX ADMIN — PROPRANOLOL HYDROCHLORIDE SCH MG: 10 TABLET ORAL at 18:51

## 2020-09-03 RX ADMIN — ARIPIPRAZOLE SCH MG: 15 TABLET ORAL at 09:31

## 2020-09-03 NOTE — MHIPNPDOC
[facility] Progress Note


Progress Note


DATE OF SERVICE: 9/3/20


Subjective


HPI:


Lowell presents today for concerns regarding receiving a new bed and an update on

his medication. He is requesting a new bed because it bends.





MEDICATIONS:


He says the medication is doing well for him.


Objective


Appearance: Hygiene is poor to fair, baseline.





Speech: Sparse in production.





Thought Form: Generally linear. Does not display any delusional thinking to me.





Thought Content: Denies any suicidal or homicidal ideation.





Judgement: Limited, likely baseline.





Insight: Limited, likely baseline.


Assessment


F20.9 Schizophrenia, unspecified


Plan


Have patient return back to [facility] on Monday.


Will attempt to contact outpatient provider to get baseline, have her review the

[facility] which indicates that this is likely his baseline.


He does not appear to be demonstrating any significant dangerousness to himself 

as he has been compliant with all medication directions and has no behavioral 

problems. Occasionally, he is known to be speaking to himself. However, this in 

itself does not appear to be able to justify keeping him against his will as per

my opinion.


The difficulty with caring for self is not seen on the unit as he appears to a

ttend to his basic needs without need for much prompting.





Vital Signs





Vital Signs








  Date Time  Temp Pulse Resp B/P (MAP) Pulse Ox O2 Delivery O2 Flow Rate FiO2


 


9/3/20 07:05 97.4 65 15 111/71 (84) 94   


 


9/2/20 06:41      Room Air  











Current Medications





Current Medications








 Medications


  (Trade)  Dose


 Ordered  Sig/Heather


 Route


 PRN Reason  Start Time


 Stop Time Status Last Admin


Dose Admin


 


 Acetaminophen


  (Tylenol Tab)  650 mg  Q6HP  PRN


 PO


 HEADACHE or DISCOMFORT  8/22/20 18:30


   Cancel  





 


 Al Hydrox/Mg


 Hydrox/Simethicone


  (Mylanta)  30 ml  Q4HP  PRN


 PO


 HEARTBURN/INDIGESTION  8/22/20 18:30


     





 


 Aripiprazole


  (AbiLIFY)  15 mg  BID


 PO


   8/22/20 21:00


    9/2/20 21:37





 


 Atorvastatin


 Calcium


  (Lipitor)  20 mg  DAILY


 PO


   8/23/20 09:00


    9/2/20 08:18





 


 Diphenhydramine


 HCl


  (Benadryl)  50 mg  TID  PRN


 PO


 ANXIETY/AGITATION  8/22/20 20:00


    9/3/20 02:44





 


 Haloperidol


  (Haldol)  20 mg  QHS


 PO


   8/22/20 21:00


    9/2/20 21:36





 


 Home Med


  (Med Rec


 Complete!)    ASDIRECTED


 XX


   8/22/20 19:00


 8/22/20 18:55 DC  





 


 Ibuprofen


  (Advil)  600 mg  TID  PRN


 PO


 PAIN  8/22/20 20:00


    8/23/20 07:34





 


 Levothyroxine


 Sodium


  (Synthroid)  25 mcg  DAILY@0600


 PO


   8/23/20 06:00


    9/3/20 06:03





 


 Losartan Potassium


  (Cozaar)  25 mg  DAILY


 PO


   8/23/20 09:00


    9/2/20 08:19





 


 Magnesium


 Hydroxide


  (Milk Of


 Magnesia)  30 ml  DAILYPRN  PRN


 PO


 CONSTIPATION  8/22/20 18:30


     





 


 Metformin HCl


  (Glucophage Xr)  500 mg  BID@0800,1800


 PO


   8/22/20 18:00


    9/2/20 17:16





 


 Non-Formulary


 Medication


  (** See Comment


 Field Below **)  SEE


 COMMENTS


 SECTION  1T@10


 XX


   9/3/20 10:00


 9/1/20 12:33 DC  





 


 Non-Formulary


 Medication


  (** See Comment


 Field Below **)  SEE LABEL


 COMMENTS  DAILY


 XX


   9/1/20 09:00


 9/1/20 13:42 DC  





 


 Non-Formulary


 Medication


  (** See Comment


 Field Below **)  SEE LABEL


 COMMENTS  DAILY


 XX


   9/2/20 09:00


     





 


 Propranolol HCl


  (Inderal)  10 mg  TID


 PO


   8/22/20 21:00


    9/2/20 21:36





 


 Trazodone HCl


  (Desyrel)  50 mg  QHSP  PRN


 PO


 INSOMNIA  8/22/20 18:30


   Cancel  





 


 Zolpidem Tartrate


  (Ambien)  10 mg  QHSP  PRN


 PO


 Insomnia  8/22/20 20:00


 8/29/20 19:59 DC 8/28/20 20:51














Allergies


Coded Allergies:  


     No Known Allergies (Unverified , 8/22/20)











DEBRA MARQUEZ DO               Sep 3, 2020 08:10

## 2020-09-04 VITALS — SYSTOLIC BLOOD PRESSURE: 163 MMHG | DIASTOLIC BLOOD PRESSURE: 86 MMHG

## 2020-09-04 VITALS — SYSTOLIC BLOOD PRESSURE: 140 MMHG

## 2020-09-04 RX ADMIN — ARIPIPRAZOLE SCH MG: 15 TABLET ORAL at 20:58

## 2020-09-04 RX ADMIN — PROPRANOLOL HYDROCHLORIDE SCH MG: 10 TABLET ORAL at 16:17

## 2020-09-04 RX ADMIN — PROPRANOLOL HYDROCHLORIDE SCH MG: 10 TABLET ORAL at 08:25

## 2020-09-04 RX ADMIN — ARIPIPRAZOLE SCH MG: 15 TABLET ORAL at 08:23

## 2020-09-04 RX ADMIN — LOSARTAN POTASSIUM SCH MG: 25 TABLET, FILM COATED ORAL at 08:24

## 2020-09-04 RX ADMIN — PROPRANOLOL HYDROCHLORIDE SCH MG: 10 TABLET ORAL at 20:57

## 2020-09-04 RX ADMIN — ATORVASTATIN CALCIUM SCH MG: 20 TABLET, FILM COATED ORAL at 08:24

## 2020-09-04 RX ADMIN — LEVOTHYROXINE SODIUM SCH MCG: 25 TABLET ORAL at 06:23

## 2020-09-04 RX ADMIN — METFORMIN HYDROCHLORIDE SCH MG: 500 TABLET, EXTENDED RELEASE ORAL at 08:25

## 2020-09-04 RX ADMIN — DIPHENHYDRAMINE HYDROCHLORIDE PRN MG: 50 CAPSULE ORAL at 20:57

## 2020-09-04 RX ADMIN — METFORMIN HYDROCHLORIDE SCH MG: 500 TABLET, EXTENDED RELEASE ORAL at 17:17

## 2020-09-04 NOTE — MHIPNPDOC
[facility] Progress Note


Progress Note


DATE OF SERVICE: 9/4/20


Subjective


HPI:


The patient is met with today. He reports that he still wants to return to 

[facility]. Of note, the only unusual thing he mentioned was that he said he

Works for a [employer] but had a fairly flat affect and did not 

perseverate too much on this. He appears to be taking care of himself and staff 

reports that hes been doing generally well without any major problems. He is 

noted to talk to himself at times, however, this appears to be in the privacy of

his room. When asked, he states that he is discussing with himself, however, he 

does not appear to be internally preoccupied when meeting.


Objective


Appearance: Fair hygiene.





Speech: Normal rate. Normal volume. Spontaneous and Fluid.





Cognition: Baseline.





Thought Form: Linear and goal directed.





Thought Content: No evidence of aggressive or homicidal ideation. No evidence of

suicidal ideation. No evidence of delusions. No thoughts of self harm.





Judgement: Likely baseline. Poor to fair.





Insight: Poor to fair. Likely baseline.


Assessment


F20.9 Schizophrenia, unspecified


Plan


Plan is to continue home medications, will likely send home on Monday. Unable to

get ahold of provider as one reviewed records from [facility], it appears that he

is not even met with this new provider Dr. Russ, and he is currently in between 

providers at this time at [facility] as he sees a resident provider 

who has since graduated.





Vital Signs





Vital Signs








  Date Time  Temp Pulse Resp B/P (MAP) Pulse Ox O2 Delivery O2 Flow Rate FiO2


 


9/4/20 08:25  55  163/86    


 


9/4/20 06:22 97.5  16  100 Room Air  











Current Medications





Current Medications








 Medications


  (Trade)  Dose


 Ordered  Sig/Heather


 Route


 PRN Reason  Start Time


 Stop Time Status Last Admin


Dose Admin


 


 Acetaminophen


  (Tylenol Tab)  650 mg  Q6HP  PRN


 PO


 HEADACHE or DISCOMFORT  8/22/20 18:30


   Cancel  





 


 Al Hydrox/Mg


 Hydrox/Simethicone


  (Mylanta)  30 ml  Q4HP  PRN


 PO


 HEARTBURN/INDIGESTION  8/22/20 18:30


     





 


 Aripiprazole


  (AbiLIFY)  15 mg  BID


 PO


   8/22/20 21:00


    9/4/20 08:23





 


 Atorvastatin


 Calcium


  (Lipitor)  20 mg  DAILY


 PO


   8/23/20 09:00


    9/4/20 08:24





 


 Diphenhydramine


 HCl


  (Benadryl)  50 mg  TID  PRN


 PO


 ANXIETY/AGITATION  8/22/20 20:00


    9/3/20 02:44





 


 Haloperidol


  (Haldol)  20 mg  QHS


 PO


   8/22/20 21:00


    9/3/20 21:40





 


 Home Med


  (Med Rec


 Complete!)    ASDIRECTED


 XX


   8/22/20 19:00


 8/22/20 18:55 DC  





 


 Ibuprofen


  (Advil)  600 mg  TID  PRN


 PO


 PAIN  8/22/20 20:00


    8/23/20 07:34





 


 Levothyroxine


 Sodium


  (Synthroid)  25 mcg  DAILY@0600


 PO


   8/23/20 06:00


    9/4/20 06:23





 


 Losartan Potassium


  (Cozaar)  25 mg  DAILY


 PO


   8/23/20 09:00


    9/4/20 08:24





 


 Magnesium


 Hydroxide


  (Milk Of


 Magnesia)  30 ml  DAILYPRN  PRN


 PO


 CONSTIPATION  8/22/20 18:30


     





 


 Metformin HCl


  (Glucophage Xr)  500 mg  BID@0800,1800


 PO


   8/22/20 18:00


    9/4/20 08:25





 


 Non-Formulary


 Medication


  (** See Comment


 Field Below **)  SEE


 COMMENTS


 SECTION  1T@10


 XX


   9/3/20 10:00


 9/1/20 12:33 DC  





 


 Non-Formulary


 Medication


  (** See Comment


 Field Below **)  SEE


 COMMENTS


 SECTION  DAILY@1000


 XX


   9/5/20 10:00


 9/6/20 09:59   





 


 Non-Formulary


 Medication


  (** See Comment


 Field Below **)  SEE LABEL


 COMMENTS  DAILY


 XX


   9/1/20 09:00


 9/1/20 13:42 DC  





 


 Non-Formulary


 Medication


  (** See Comment


 Field Below **)  SEE LABEL


 COMMENTS  DAILY


 XX


   9/2/20 09:00


 9/3/20 11:04 DC  





 


 Propranolol HCl


  (Inderal)  10 mg  TID


 PO


   8/22/20 21:00


    9/4/20 08:25





 


 Trazodone HCl


  (Desyrel)  50 mg  QHSP  PRN


 PO


 INSOMNIA  8/22/20 18:30


   Cancel  





 


 Zolpidem Tartrate


  (Ambien)  10 mg  QHSP  PRN


 PO


 Insomnia  8/22/20 20:00


 8/29/20 19:59 DC 8/28/20 20:51














Allergies


Coded Allergies:  


     No Known Allergies (Unverified , 8/22/20)











DEBRA MARQUEZ DO               Sep 4, 2020 09:52

## 2020-09-05 VITALS — DIASTOLIC BLOOD PRESSURE: 85 MMHG | SYSTOLIC BLOOD PRESSURE: 140 MMHG

## 2020-09-05 RX ADMIN — ATORVASTATIN CALCIUM SCH MG: 20 TABLET, FILM COATED ORAL at 09:49

## 2020-09-05 RX ADMIN — LEVOTHYROXINE SODIUM SCH MCG: 25 TABLET ORAL at 05:52

## 2020-09-05 RX ADMIN — METFORMIN HYDROCHLORIDE SCH MG: 500 TABLET, EXTENDED RELEASE ORAL at 09:50

## 2020-09-05 RX ADMIN — PROPRANOLOL HYDROCHLORIDE SCH MG: 10 TABLET ORAL at 21:19

## 2020-09-05 RX ADMIN — PROPRANOLOL HYDROCHLORIDE SCH MG: 10 TABLET ORAL at 15:54

## 2020-09-05 RX ADMIN — METFORMIN HYDROCHLORIDE SCH MG: 500 TABLET, EXTENDED RELEASE ORAL at 18:31

## 2020-09-05 RX ADMIN — PROPRANOLOL HYDROCHLORIDE SCH MG: 10 TABLET ORAL at 09:50

## 2020-09-05 RX ADMIN — IBUPROFEN PRN MG: 600 TABLET, FILM COATED ORAL at 06:04

## 2020-09-05 RX ADMIN — ARIPIPRAZOLE SCH MG: 15 TABLET ORAL at 09:49

## 2020-09-05 RX ADMIN — LOSARTAN POTASSIUM SCH MG: 25 TABLET, FILM COATED ORAL at 09:49

## 2020-09-05 RX ADMIN — ARIPIPRAZOLE SCH MG: 15 TABLET ORAL at 21:19

## 2020-09-06 VITALS — SYSTOLIC BLOOD PRESSURE: 152 MMHG | DIASTOLIC BLOOD PRESSURE: 80 MMHG

## 2020-09-06 VITALS — DIASTOLIC BLOOD PRESSURE: 82 MMHG | SYSTOLIC BLOOD PRESSURE: 162 MMHG

## 2020-09-06 RX ADMIN — ARIPIPRAZOLE SCH MG: 15 TABLET ORAL at 08:17

## 2020-09-06 RX ADMIN — PROPRANOLOL HYDROCHLORIDE SCH MG: 10 TABLET ORAL at 20:19

## 2020-09-06 RX ADMIN — METFORMIN HYDROCHLORIDE SCH MG: 500 TABLET, EXTENDED RELEASE ORAL at 08:18

## 2020-09-06 RX ADMIN — LEVOTHYROXINE SODIUM SCH MCG: 25 TABLET ORAL at 05:53

## 2020-09-06 RX ADMIN — PROPRANOLOL HYDROCHLORIDE SCH MG: 10 TABLET ORAL at 15:23

## 2020-09-06 RX ADMIN — ARIPIPRAZOLE SCH MG: 15 TABLET ORAL at 20:19

## 2020-09-06 RX ADMIN — PROPRANOLOL HYDROCHLORIDE SCH MG: 10 TABLET ORAL at 08:19

## 2020-09-06 RX ADMIN — LOSARTAN POTASSIUM SCH MG: 25 TABLET, FILM COATED ORAL at 08:17

## 2020-09-06 RX ADMIN — ATORVASTATIN CALCIUM SCH MG: 20 TABLET, FILM COATED ORAL at 08:19

## 2020-09-06 RX ADMIN — METFORMIN HYDROCHLORIDE SCH MG: 500 TABLET, EXTENDED RELEASE ORAL at 18:01

## 2020-09-07 VITALS — DIASTOLIC BLOOD PRESSURE: 88 MMHG | SYSTOLIC BLOOD PRESSURE: 152 MMHG

## 2020-09-07 VITALS — DIASTOLIC BLOOD PRESSURE: 81 MMHG | SYSTOLIC BLOOD PRESSURE: 151 MMHG

## 2020-09-07 RX ADMIN — ARIPIPRAZOLE SCH MG: 15 TABLET ORAL at 20:44

## 2020-09-07 RX ADMIN — LOSARTAN POTASSIUM SCH MG: 25 TABLET, FILM COATED ORAL at 09:24

## 2020-09-07 RX ADMIN — DIPHENHYDRAMINE HYDROCHLORIDE PRN MG: 50 CAPSULE ORAL at 04:04

## 2020-09-07 RX ADMIN — METFORMIN HYDROCHLORIDE SCH MG: 500 TABLET, EXTENDED RELEASE ORAL at 17:41

## 2020-09-07 RX ADMIN — PROPRANOLOL HYDROCHLORIDE SCH MG: 10 TABLET ORAL at 16:35

## 2020-09-07 RX ADMIN — ATORVASTATIN CALCIUM SCH MG: 20 TABLET, FILM COATED ORAL at 09:24

## 2020-09-07 RX ADMIN — LEVOTHYROXINE SODIUM SCH MCG: 25 TABLET ORAL at 05:54

## 2020-09-07 RX ADMIN — PROPRANOLOL HYDROCHLORIDE SCH MG: 10 TABLET ORAL at 09:24

## 2020-09-07 RX ADMIN — PROPRANOLOL HYDROCHLORIDE SCH MG: 10 TABLET ORAL at 20:44

## 2020-09-07 RX ADMIN — METFORMIN HYDROCHLORIDE SCH MG: 500 TABLET, EXTENDED RELEASE ORAL at 09:24

## 2020-09-07 RX ADMIN — ARIPIPRAZOLE SCH MG: 15 TABLET ORAL at 09:24

## 2020-09-07 RX ADMIN — IBUPROFEN PRN MG: 600 TABLET, FILM COATED ORAL at 09:26

## 2020-09-07 NOTE — MHIPNPDOC
[facility] Progress Note


Progress Note


DATE OF SERVICE: 9/7/20





HISTORY: .





VITAL SIGNS: See below.





NEW TEST RESULTS: .





CURRENT MEDICATIONS: See below.





MENTAL STATUS EXAMINATION:


Patient is a -year old male, who is .


Speech: Is .


Language skills are .


Thought processes including: .


Thought content: . Abstract reasoning, and computation: . Description of associa

tions: .


Description of abnormal or psychotic thoughts: .


Judgment: .


Insight: [very limited, good, fair. poor].


Orientation: .


Recent and remote memory: .


Attention span and concentration: .


Language: .


Fund of knowledge: .


Mood: . Affect: .





DIAGNOSES:


1. .


2. .


3. .


 


ASSESSMENT:





MANAGEMENT PLAN: .





TIME SPENT:  minutes.





Vital Signs





Vital Signs








  Date Time  Temp Pulse Resp B/P (MAP) Pulse Ox O2 Delivery O2 Flow Rate FiO2


 


9/7/20 09:24  73  151/81    


 


9/7/20 06:23 97.7  14  94 Room Air  











Current Medications





Current Medications








 Medications


  (Trade)  Dose


 Ordered  Sig/Heather


 Route


 PRN Reason  Start Time


 Stop Time Status Last Admin


Dose Admin


 


 Acetaminophen


  (Tylenol Tab)  650 mg  Q6HP  PRN


 PO


 HEADACHE or DISCOMFORT  8/22/20 18:30


   Cancel  





 


 Al Hydrox/Mg


 Hydrox/Simethicone


  (Mylanta)  30 ml  Q4HP  PRN


 PO


 HEARTBURN/INDIGESTION  8/22/20 18:30


     





 


 Aripiprazole


  (AbiLIFY)  15 mg  BID


 PO


   8/22/20 21:00


    9/7/20 09:24





 


 Atorvastatin


 Calcium


  (Lipitor)  20 mg  DAILY


 PO


   8/23/20 09:00


    9/7/20 09:24





 


 Diphenhydramine


 HCl


  (Benadryl)  50 mg  TID  PRN


 PO


 ANXIETY/AGITATION  8/22/20 20:00


    9/7/20 04:04





 


 Haloperidol


  (Haldol)  20 mg  QHS


 PO


   8/22/20 21:00


    9/6/20 20:18





 


 Home Med


  (Med Rec


 Complete!)    ASDIRECTED


 XX


   8/22/20 19:00


 8/22/20 18:55 DC  





 


 Ibuprofen


  (Advil)  600 mg  TID  PRN


 PO


 PAIN  8/22/20 20:00


    9/7/20 09:26





 


 Levothyroxine


 Sodium


  (Synthroid)  25 mcg  DAILY@0600


 PO


   8/23/20 06:00


    9/7/20 05:54





 


 Losartan Potassium


  (Cozaar)  25 mg  DAILY


 PO


   8/23/20 09:00


    9/7/20 09:24





 


 Magnesium


 Hydroxide


  (Milk Of


 Magnesia)  30 ml  DAILYPRN  PRN


 PO


 CONSTIPATION  8/22/20 18:30


     





 


 Metformin HCl


  (Glucophage Xr)  500 mg  BID@0800,1800


 PO


   8/22/20 18:00


    9/7/20 09:24





 


 Non-Formulary


 Medication


  (** See Comment


 Field Below **)  SEE


 COMMENTS


 SECTION  1T@10


 XX


   9/3/20 10:00


 9/1/20 12:33 DC  





 


 Non-Formulary


 Medication


  (** See Comment


 Field Below **)  SEE


 COMMENTS


 SECTION  DAILY@1000


 XX


   9/5/20 10:00


 9/6/20 09:59 DC  





 


 Non-Formulary


 Medication


  (** See Comment


 Field Below **)  SEE LABEL


 COMMENTS  DAILY


 XX


   9/1/20 09:00


 9/1/20 13:42 DC  





 


 Non-Formulary


 Medication


  (** See Comment


 Field Below **)  SEE LABEL


 COMMENTS  DAILY


 XX


   9/2/20 09:00


 9/3/20 11:04 DC  





 


 Propranolol HCl


  (Inderal)  10 mg  TID


 PO


   8/22/20 21:00


    9/7/20 09:24





 


 Trazodone HCl


  (Desyrel)  50 mg  QHSP  PRN


 PO


 INSOMNIA  8/22/20 18:30


   Cancel  





 


 Zolpidem Tartrate


  (Ambien)  10 mg  QHSP  PRN


 PO


 Insomnia  8/22/20 20:00


 8/29/20 19:59 DC 8/28/20 20:51





 


 Zolpidem Tartrate


  (Ambien)  10 mg  QHSP  PRN


 PO


 Insomnia   9/4/20 23:00


    9/6/20 20:18














Allergies


Coded Allergies:  


     No Known Allergies (Unverified , 8/22/20)











DEBRA MARQUEZ DO               Sep 7, 2020 09:56

## 2020-09-08 VITALS — SYSTOLIC BLOOD PRESSURE: 146 MMHG | DIASTOLIC BLOOD PRESSURE: 97 MMHG

## 2020-09-08 VITALS — SYSTOLIC BLOOD PRESSURE: 166 MMHG | DIASTOLIC BLOOD PRESSURE: 99 MMHG

## 2020-09-08 LAB
BASOPHILS # BLD AUTO: 0 10^3/UL (ref 0–0.2)
BASOPHILS NFR BLD AUTO: 0.3 % (ref 0–1)
EOSINOPHIL # BLD AUTO: 0.1 10^3/UL (ref 0–0.5)
EOSINOPHIL NFR BLD AUTO: 1 % (ref 0–3)
HCT VFR BLD AUTO: 44.5 % (ref 42–52)
HGB BLD-MCNC: 15 G/DL (ref 13.5–17.5)
LYMPHOCYTES # BLD AUTO: 2.4 10^3/UL (ref 1.5–5)
LYMPHOCYTES NFR BLD AUTO: 39 % (ref 24–44)
MCH RBC QN AUTO: 29 PG (ref 27–33)
MCHC RBC AUTO-ENTMCNC: 33.7 G/DL (ref 32–36.5)
MCV RBC AUTO: 85.9 FL (ref 80–96)
MONOCYTES # BLD AUTO: 0.7 10^3/UL (ref 0–0.8)
MONOCYTES NFR BLD AUTO: 12 % (ref 0–5)
NEUTROPHILS # BLD AUTO: 2.9 10^3/UL (ref 1.5–8.5)
NEUTROPHILS NFR BLD AUTO: 47.5 % (ref 36–66)
PLATELET # BLD AUTO: 212 10^3/UL (ref 150–450)
RBC # BLD AUTO: 5.18 10^6/UL (ref 4.3–6.1)
WBC # BLD AUTO: 6.2 10^3/UL (ref 4–10)

## 2020-09-08 RX ADMIN — METFORMIN HYDROCHLORIDE SCH MG: 500 TABLET, EXTENDED RELEASE ORAL at 10:05

## 2020-09-08 RX ADMIN — PROPRANOLOL HYDROCHLORIDE SCH MG: 10 TABLET ORAL at 16:57

## 2020-09-08 RX ADMIN — ARIPIPRAZOLE SCH MG: 15 TABLET ORAL at 10:06

## 2020-09-08 RX ADMIN — LOSARTAN POTASSIUM SCH MG: 25 TABLET, FILM COATED ORAL at 10:06

## 2020-09-08 RX ADMIN — PROPRANOLOL HYDROCHLORIDE SCH MG: 10 TABLET ORAL at 21:57

## 2020-09-08 RX ADMIN — PROPRANOLOL HYDROCHLORIDE SCH MG: 10 TABLET ORAL at 10:05

## 2020-09-08 RX ADMIN — METFORMIN HYDROCHLORIDE SCH MG: 500 TABLET, EXTENDED RELEASE ORAL at 16:58

## 2020-09-08 RX ADMIN — LEVOTHYROXINE SODIUM SCH MCG: 25 TABLET ORAL at 05:13

## 2020-09-08 RX ADMIN — ARIPIPRAZOLE SCH MG: 15 TABLET ORAL at 21:57

## 2020-09-08 RX ADMIN — ATORVASTATIN CALCIUM SCH MG: 20 TABLET, FILM COATED ORAL at 10:05

## 2020-09-08 NOTE — MHIPNPDOC
[facility] Progress Note


Progress Note


DATE OF SERVICE: 9/8/20


Subjective


HPI:


Lowell presents today for his schizophrenia. speakers in yes and no answers, 

thought blocking present, generally not conversive. Nursing staff note that he 

hasn't had an episodes of agitation over night.


Objective


Appearance: Poor hygiene. Appears to be stated age. Well nourished.





Affect: Flat.





Mood: "fine"





Speech: Nearly mute.





Thought Form: Thought blocking present.





Judgement: Poor.





Insight: Poor.


Assessment


F20.89 Other schizophrenia


Plan


The patient was met with today. He is still fairly flat. However, he meets with 

his provider only superficially and does not engage much with significant 

thought blocking. Has had no episodes of agitation but has been noticing to be 

yelling at himself, talking incoherently to unseen others.


Will start Clozapine as an augmentation agent 12.5 mg. CBC to be drawn. Cancel 

discharge as patient could be improved, possibly may need long-term.





Vital Signs





Vital Signs








  Date Time  Temp Pulse Resp B/P (MAP) Pulse Ox O2 Delivery O2 Flow Rate FiO2


 


9/8/20 06:13 97.3 69 15 146/97 (113) 95 Room Air  











Current Medications





Current Medications








 Medications


  (Trade)  Dose


 Ordered  Sig/Heather


 Route


 PRN Reason  Start Time


 Stop Time Status Last Admin


Dose Admin


 


 Acetaminophen


  (Tylenol Tab)  650 mg  Q6HP  PRN


 PO


 HEADACHE or DISCOMFORT  8/22/20 18:30


   Cancel  





 


 Al Hydrox/Mg


 Hydrox/Simethicone


  (Mylanta)  30 ml  Q4HP  PRN


 PO


 HEARTBURN/INDIGESTION  8/22/20 18:30


     





 


 Aripiprazole


  (AbiLIFY)  15 mg  BID


 PO


   8/22/20 21:00


    9/7/20 20:44





 


 Atorvastatin


 Calcium


  (Lipitor)  20 mg  DAILY


 PO


   8/23/20 09:00


    9/7/20 09:24





 


 Diphenhydramine


 HCl


  (Benadryl)  50 mg  TID  PRN


 PO


 ANXIETY/AGITATION  8/22/20 20:00


    9/7/20 04:04





 


 Haloperidol


  (Haldol)  20 mg  QHS


 PO


   8/22/20 21:00


    9/7/20 20:43





 


 Home Med


  (Med Rec


 Complete!)    ASDIRECTED


 XX


   8/22/20 19:00


 8/22/20 18:55 DC  





 


 Ibuprofen


  (Advil)  600 mg  TID  PRN


 PO


 PAIN  8/22/20 20:00


    9/7/20 09:26





 


 Levothyroxine


 Sodium


  (Synthroid)  25 mcg  DAILY@0600


 PO


   8/23/20 06:00


    9/8/20 05:13





 


 Losartan Potassium


  (Cozaar)  25 mg  DAILY


 PO


   8/23/20 09:00


    9/7/20 09:24





 


 Magnesium


 Hydroxide


  (Milk Of


 Magnesia)  30 ml  DAILYPRN  PRN


 PO


 CONSTIPATION  8/22/20 18:30


     





 


 Metformin HCl


  (Glucophage Xr)  500 mg  BID@0800,1800


 PO


   8/22/20 18:00


    9/7/20 17:41





 


 Non-Formulary


 Medication


  (** See Comment


 Field Below **)  SEE


 COMMENTS


 SECTION  1T@10


 XX


   9/3/20 10:00


 9/1/20 12:33 DC  





 


 Non-Formulary


 Medication


  (** See Comment


 Field Below **)  SEE


 COMMENTS


 SECTION  DAILY@1000


 XX


   9/5/20 10:00


 9/6/20 09:59 DC  





 


 Non-Formulary


 Medication


  (** See Comment


 Field Below **)  SEE LABEL


 COMMENTS  DAILY


 XX


   9/1/20 09:00


 9/1/20 13:42 DC  





 


 Non-Formulary


 Medication


  (** See Comment


 Field Below **)  SEE LABEL


 COMMENTS  DAILY


 XX


   9/2/20 09:00


 9/3/20 11:04 DC  





 


 Propranolol HCl


  (Inderal)  10 mg  TID


 PO


   8/22/20 21:00


    9/7/20 20:44





 


 Trazodone HCl


  (Desyrel)  50 mg  QHSP  PRN


 PO


 INSOMNIA  8/22/20 18:30


   Cancel  





 


 Zolpidem Tartrate


  (Ambien)  10 mg  QHSP  PRN


 PO


 Insomnia  8/22/20 20:00


 8/29/20 19:59 DC 8/28/20 20:51





 


 Zolpidem Tartrate


  (Ambien)  10 mg  QHSP  PRN


 PO


 Insomnia   9/4/20 23:00


    9/7/20 20:43














Allergies


Coded Allergies:  


     No Known Allergies (Unverified , 8/22/20)











DEBRA MARQUEZ DO               Sep 8, 2020 09:49

## 2020-09-09 VITALS — SYSTOLIC BLOOD PRESSURE: 140 MMHG | DIASTOLIC BLOOD PRESSURE: 84 MMHG

## 2020-09-09 VITALS — DIASTOLIC BLOOD PRESSURE: 76 MMHG | SYSTOLIC BLOOD PRESSURE: 162 MMHG

## 2020-09-09 RX ADMIN — ARIPIPRAZOLE SCH MG: 15 TABLET ORAL at 09:32

## 2020-09-09 RX ADMIN — LOSARTAN POTASSIUM SCH MG: 25 TABLET, FILM COATED ORAL at 09:32

## 2020-09-09 RX ADMIN — ATORVASTATIN CALCIUM SCH MG: 20 TABLET, FILM COATED ORAL at 09:32

## 2020-09-09 RX ADMIN — PROPRANOLOL HYDROCHLORIDE SCH MG: 10 TABLET ORAL at 17:11

## 2020-09-09 RX ADMIN — METFORMIN HYDROCHLORIDE SCH MG: 500 TABLET, EXTENDED RELEASE ORAL at 17:10

## 2020-09-09 RX ADMIN — METFORMIN HYDROCHLORIDE SCH MG: 500 TABLET, EXTENDED RELEASE ORAL at 09:31

## 2020-09-09 RX ADMIN — LEVOTHYROXINE SODIUM SCH MCG: 25 TABLET ORAL at 06:09

## 2020-09-09 RX ADMIN — PROPRANOLOL HYDROCHLORIDE SCH MG: 10 TABLET ORAL at 09:32

## 2020-09-09 RX ADMIN — PROPRANOLOL HYDROCHLORIDE SCH MG: 10 TABLET ORAL at 21:45

## 2020-09-09 RX ADMIN — ARIPIPRAZOLE SCH MG: 15 TABLET ORAL at 21:44

## 2020-09-09 RX ADMIN — CLOZAPINE SCH MG: 25 TABLET ORAL at 21:44

## 2020-09-09 NOTE — MHIPNPDOC
[facility] Progress Note


Progress Note


DATE OF SERVICE: 9/9/20


Subjective


HPI:


Lowell presents today for his inpatient psychiatric admission. He is attempted to

be met with today, but he refuses to meet with his provider, stating that he is 

getting his blood drawn. He is somewhat bizarre and although nursing staff note 

that he is more talkative, they report many delusions of being a  and 

being a famous martial artist.


Objective


Appearance: Hygiene poor. Well nourished. Appears to be stated age.





Cognition: Impaired secondary to thought process.





Thought Form: Thought blocking. Tangential and paranoid with grandiose 

delusions.





Judgement: Poor.





Insight: Poor.


Assessment


F20.9 Schizophrenia, unspecified


Plan


Start Clozapine at 25 mg nightly.


Monitor for constipation.


CBC to be repeated.


Likely bed-to-bed to either long-term or to new group house as he improves.





Vital Signs





Vital Signs








  Date Time  Temp Pulse Resp B/P (MAP) Pulse Ox O2 Delivery O2 Flow Rate FiO2


 


9/9/20 06:24 97.0 96 16 162/76 (104) 97 Room Air  











Laboratory Data


24[facility] Labs


Laboratory Tests 2


9/8/20 11:13: 


Immature Granulocyte % (Auto) 0.2, Neutrophils (%) (Auto) 47.5, Lymphocytes (%) 

(Auto) 39.0, Monocytes (%) (Auto) 12.0H, Eosinophils (%) (Auto) 1.0, Basophils 

(%) (Auto) 0.3, Neutrophils # (Auto) 2.9, Lymphocytes # (Auto) 2.4, Monocytes # 

(Auto) 0.7, Eosinophils # (Auto) 0.1, Basophils # (Auto) 0.0, Nucleated Red 

Blood Cells % (auto) 0.0


CBC/BMP


Laboratory Tests


9/8/20 11:13











Current Medications





Current Medications








 Medications


  (Trade)  Dose


 Ordered  Sig/Heather


 Route


 PRN Reason  Start Time


 Stop Time Status Last Admin


Dose Admin


 


 Acetaminophen


  (Tylenol Tab)  650 mg  Q6HP  PRN


 PO


 HEADACHE or DISCOMFORT  8/22/20 18:30


   Cancel  





 


 Al Hydrox/Mg


 Hydrox/Simethicone


  (Mylanta)  30 ml  Q4HP  PRN


 PO


 HEARTBURN/INDIGESTION  8/22/20 18:30


     





 


 Aripiprazole


  (AbiLIFY)  15 mg  BID


 PO


   8/22/20 21:00


    9/8/20 21:57





 


 Atorvastatin


 Calcium


  (Lipitor)  20 mg  DAILY


 PO


   8/23/20 09:00


    9/8/20 10:05





 


 Clozapine


  (Clozaril)  12.5 mg  QHS


 PO


   9/8/20 21:00


    9/8/20 21:57





 


 Diphenhydramine


 HCl


  (Benadryl)  50 mg  TID  PRN


 PO


 ANXIETY/AGITATION  8/22/20 20:00


    9/7/20 04:04





 


 Haloperidol


  (Haldol)  20 mg  QHS


 PO


   8/22/20 21:00


    9/8/20 21:57





 


 Home Med


  (Med Rec


 Complete!)    ASDIRECTED


 XX


   8/22/20 19:00


 8/22/20 18:55 DC  





 


 Ibuprofen


  (Advil)  600 mg  TID  PRN


 PO


 PAIN  8/22/20 20:00


    9/7/20 09:26





 


 Levothyroxine


 Sodium


  (Synthroid)  25 mcg  DAILY@0600


 PO


   8/23/20 06:00


    9/9/20 06:09





 


 Losartan Potassium


  (Cozaar)  25 mg  DAILY


 PO


   8/23/20 09:00


    9/8/20 10:06





 


 Magnesium


 Hydroxide


  (Milk Of


 Magnesia)  30 ml  DAILYPRN  PRN


 PO


 CONSTIPATION  8/22/20 18:30


     





 


 Metformin HCl


  (Glucophage Xr)  500 mg  BID@0800,1800


 PO


   8/22/20 18:00


    9/8/20 16:58





 


 Non-Formulary


 Medication


  (** See Comment


 Field Below **)  SEE


 COMMENTS


 SECTION  1T@10


 XX


   9/3/20 10:00


 9/1/20 12:33 DC  





 


 Non-Formulary


 Medication


  (** See Comment


 Field Below **)  SEE


 COMMENTS


 SECTION  DAILY@1000


 XX


   9/5/20 10:00


 9/6/20 09:59 DC  





 


 Non-Formulary


 Medication


  (** See Comment


 Field Below **)  SEE LABEL


 COMMENTS  DAILY


 XX


   9/1/20 09:00


 9/1/20 13:42 DC  





 


 Non-Formulary


 Medication


  (** See Comment


 Field Below **)  SEE LABEL


 COMMENTS  DAILY


 XX


   9/2/20 09:00


 9/3/20 11:04 DC  





 


 Propranolol HCl


  (Inderal)  10 mg  TID


 PO


   8/22/20 21:00


    9/8/20 21:57





 


 Trazodone HCl


  (Desyrel)  50 mg  QHSP  PRN


 PO


 INSOMNIA  8/22/20 18:30


   Cancel  





 


 Zolpidem Tartrate


  (Ambien)  10 mg  QHSP  PRN


 PO


 Insomnia  8/22/20 20:00


 8/29/20 19:59 DC 8/28/20 20:51





 


 Zolpidem Tartrate


  (Ambien)  10 mg  QHSP  PRN


 PO


 Insomnia   9/4/20 23:00


    9/8/20 21:56














Allergies


Coded Allergies:  


     No Known Allergies (Unverified , 8/22/20)











DEBRA MARQUEZ DO               Sep 9, 2020 08:51

## 2020-09-10 VITALS — SYSTOLIC BLOOD PRESSURE: 144 MMHG | DIASTOLIC BLOOD PRESSURE: 78 MMHG

## 2020-09-10 VITALS — SYSTOLIC BLOOD PRESSURE: 140 MMHG | DIASTOLIC BLOOD PRESSURE: 69 MMHG

## 2020-09-10 RX ADMIN — ARIPIPRAZOLE SCH MG: 15 TABLET ORAL at 20:05

## 2020-09-10 RX ADMIN — LOSARTAN POTASSIUM SCH MG: 25 TABLET, FILM COATED ORAL at 09:30

## 2020-09-10 RX ADMIN — ARIPIPRAZOLE SCH MG: 15 TABLET ORAL at 09:29

## 2020-09-10 RX ADMIN — ATORVASTATIN CALCIUM SCH MG: 20 TABLET, FILM COATED ORAL at 09:30

## 2020-09-10 RX ADMIN — PROPRANOLOL HYDROCHLORIDE SCH MG: 10 TABLET ORAL at 20:06

## 2020-09-10 RX ADMIN — PROPRANOLOL HYDROCHLORIDE SCH MG: 10 TABLET ORAL at 09:30

## 2020-09-10 RX ADMIN — METFORMIN HYDROCHLORIDE SCH MG: 500 TABLET, EXTENDED RELEASE ORAL at 16:54

## 2020-09-10 RX ADMIN — DIPHENHYDRAMINE HYDROCHLORIDE PRN MG: 50 CAPSULE ORAL at 01:06

## 2020-09-10 RX ADMIN — CLOZAPINE SCH MG: 25 TABLET ORAL at 20:05

## 2020-09-10 RX ADMIN — PROPRANOLOL HYDROCHLORIDE SCH MG: 10 TABLET ORAL at 16:54

## 2020-09-10 RX ADMIN — METFORMIN HYDROCHLORIDE SCH MG: 500 TABLET, EXTENDED RELEASE ORAL at 09:29

## 2020-09-10 RX ADMIN — LEVOTHYROXINE SODIUM SCH MCG: 25 TABLET ORAL at 06:20

## 2020-09-10 NOTE — MHIPNPDOC
[facility] Progress Note


Progress Note


DATE OF SERVICE: 9/10/20


Subjective


HPI:


Lowell presents today for a follow-up regarding his medication. Patient notes 

that he is doing okay. Patient states his new medication is doing well for now. 

He notes he has quieter thoughts. Patient states he is going to the bathroom 

well. Patient denies suicidal thoughts or hallucinations. Patient is concerned 

about weight loss.


Objective


Appearance: Poor hygiene. Appears to be stated age. Well nourished.





Affect: Flat.





Mood: Generally good. Appropriately reactive. Euthymic.





Speech: Nearly mute.





Thought Form: Thought blocking present.





Judgement: Poor.





Insight: Poor.





Assessment


F20.89 Other schizophrenia


Plan


Patient will continue current medication regimen.





Vital Signs





Vital Signs








  Date Time  Temp Pulse Resp B/P (MAP) Pulse Ox O2 Delivery O2 Flow Rate FiO2


 


9/10/20 06:34 97.4 64 16 140/69 (92) 92 Room Air  











Current Medications





Current Medications








 Medications


  (Trade)  Dose


 Ordered  Sig/Heather


 Route


 PRN Reason  Start Time


 Stop Time Status Last Admin


Dose Admin


 


 Acetaminophen


  (Tylenol Tab)  650 mg  Q6HP  PRN


 PO


 HEADACHE or DISCOMFORT  8/22/20 18:30


   Cancel  





 


 Al Hydrox/Mg


 Hydrox/Simethicone


  (Mylanta)  30 ml  Q4HP  PRN


 PO


 HEARTBURN/INDIGESTION  8/22/20 18:30


     





 


 Aripiprazole


  (AbiLIFY)  15 mg  BID


 PO


   8/22/20 21:00


    9/9/20 21:44





 


 Atorvastatin


 Calcium


  (Lipitor)  20 mg  DAILY


 PO


   8/23/20 09:00


    9/9/20 09:32





 


 Clozapine


  (Clozaril)  12.5 mg  QHS


 PO


   9/8/20 21:00


 9/9/20 17:10 DC 9/8/20 21:57





 


 Clozapine


  (Clozaril)  25 mg  QHS


 PO


   9/9/20 21:00


    9/9/20 21:44





 


 Diphenhydramine


 HCl


  (Benadryl)  50 mg  TID  PRN


 PO


 ANXIETY/AGITATION  8/22/20 20:00


    9/10/20 01:06





 


 Haloperidol


  (Haldol)  20 mg  QHS


 PO


   8/22/20 21:00


    9/9/20 21:44





 


 Home Med


  (Med Rec


 Complete!)    ASDIRECTED


 XX


   8/22/20 19:00


 8/22/20 18:55 DC  





 


 Ibuprofen


  (Advil)  600 mg  TID  PRN


 PO


 PAIN  8/22/20 20:00


    9/7/20 09:26





 


 Levothyroxine


 Sodium


  (Synthroid)  25 mcg  DAILY@0600


 PO


   8/23/20 06:00


    9/10/20 06:20





 


 Losartan Potassium


  (Cozaar)  25 mg  DAILY


 PO


   8/23/20 09:00


    9/9/20 09:32





 


 Magnesium


 Hydroxide


  (Milk Of


 Magnesia)  30 ml  DAILYPRN  PRN


 PO


 CONSTIPATION  8/22/20 18:30


     





 


 Metformin HCl


  (Glucophage Xr)  500 mg  BID@0800,1800


 PO


   8/22/20 18:00


    9/9/20 17:10





 


 Non-Formulary


 Medication


  (** See Comment


 Field Below **)  SEE


 COMMENTS


 SECTION  1T@10


 XX


   9/3/20 10:00


 9/1/20 12:33 DC  





 


 Non-Formulary


 Medication


  (** See Comment


 Field Below **)  SEE


 COMMENTS


 SECTION  DAILY@1000


 XX


   9/5/20 10:00


 9/6/20 09:59 DC  





 


 Non-Formulary


 Medication


  (** See Comment


 Field Below **)  SEE LABEL


 COMMENTS  DAILY


 XX


   9/1/20 09:00


 9/1/20 13:42 DC  





 


 Non-Formulary


 Medication


  (** See Comment


 Field Below **)  SEE LABEL


 COMMENTS  DAILY


 XX


   9/2/20 09:00


 9/3/20 11:04 DC  





 


 Propranolol HCl


  (Inderal)  10 mg  TID


 PO


   8/22/20 21:00


    9/9/20 21:45





 


 Trazodone HCl


  (Desyrel)  50 mg  QHSP  PRN


 PO


 INSOMNIA  8/22/20 18:30


   Cancel  





 


 Zolpidem Tartrate


  (Ambien)  10 mg  QHSP  PRN


 PO


 Insomnia  8/22/20 20:00


 8/29/20 19:59 DC 8/28/20 20:51





 


 Zolpidem Tartrate


  (Ambien)  10 mg  QHSP  PRN


 PO


 Insomnia   9/4/20 23:00


    9/9/20 21:44














Allergies


Coded Allergies:  


     No Known Allergies (Unverified , 8/22/20)











DEBRA MARQUEZ DO              Sep 10, 2020 07:47

## 2020-09-11 VITALS — DIASTOLIC BLOOD PRESSURE: 84 MMHG | SYSTOLIC BLOOD PRESSURE: 102 MMHG

## 2020-09-11 VITALS — DIASTOLIC BLOOD PRESSURE: 74 MMHG | SYSTOLIC BLOOD PRESSURE: 154 MMHG

## 2020-09-11 RX ADMIN — ARIPIPRAZOLE SCH MG: 15 TABLET ORAL at 09:37

## 2020-09-11 RX ADMIN — PROPRANOLOL HYDROCHLORIDE SCH MG: 10 TABLET ORAL at 16:27

## 2020-09-11 RX ADMIN — LOSARTAN POTASSIUM SCH MG: 25 TABLET, FILM COATED ORAL at 09:37

## 2020-09-11 RX ADMIN — METFORMIN HYDROCHLORIDE SCH MG: 500 TABLET, EXTENDED RELEASE ORAL at 09:37

## 2020-09-11 RX ADMIN — PROPRANOLOL HYDROCHLORIDE SCH MG: 10 TABLET ORAL at 22:10

## 2020-09-11 RX ADMIN — PROPRANOLOL HYDROCHLORIDE SCH MG: 10 TABLET ORAL at 09:37

## 2020-09-11 RX ADMIN — CLOZAPINE SCH MG: 25 TABLET ORAL at 22:09

## 2020-09-11 RX ADMIN — LEVOTHYROXINE SODIUM SCH MCG: 25 TABLET ORAL at 05:40

## 2020-09-11 RX ADMIN — METFORMIN HYDROCHLORIDE SCH MG: 500 TABLET, EXTENDED RELEASE ORAL at 17:52

## 2020-09-11 RX ADMIN — ARIPIPRAZOLE SCH MG: 15 TABLET ORAL at 22:09

## 2020-09-11 RX ADMIN — ATORVASTATIN CALCIUM SCH MG: 20 TABLET, FILM COATED ORAL at 09:37

## 2020-09-11 NOTE — MHHPE
DATE OF ADMISSION:  08/22/2020



CHIEF COMPLAINT:  Says he is not sure why he is here.



SUBJECTIVE:  He is 50 years old, has a history of schizophrenia, attends [facility] in [place], through [facility], has had several
inpatient hospitalizations, per the patient, says was last admitted a couple of 
years ago, but it should be noted the patient is not a very reliable historian, 
most of the history is obtained from the emergency room (ER) report.  He was 
essentially transferred from [facility], as they apparently had no beds.  
He had gone there as there were concerns regarding his ability to cater to 
himself, has been experiencing auditory hallucinations, has apparently been off 
his medicines, and has had difficulty looking after himself, we understand that 
the living situation was also quite challenging, he says stays on his own, has 
family in the area, including a mother, but unsure of contact with her as such.



He says he had missed taking a medication, which he calls [facility], says it is a 
Phenobarbital, but I am not sure whether he is on it in the first place.  He 
also spoke of seeing his wives and several children, he says they would camp 
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 
related to security, and his daughter takes care of it, and again these are not 
necessarily statements based on reality.



PAST PSYCHIATRIC HISTORY:  Has a history of schizophrenia.  Has had several 
inpatient hospitalizations.  Attends a clinic at [place].  



According to some of the records, he is on various medications, these include:

- haloperidol decanoate 100 mg every month, last received it 08/01/2020

- he is also on Abilify 15 mg twice a day 

- oral Haldol 22.5 mg daily 

- Synthroid 25 mcg daily 

- ibuprofen 600 mg every 6 hours as needed 

- Claritin 10 mg daily 

- losartan 25 mg daily 

- metformin, though there is some confusion as to how much he takes

- propranolol 10 mg three times a day 



MEDICAL HISTORY:  Hypertension, diabetes, hypothyroidism, high cholesterol.



SOCIAL HISTORY:  Unclear of details, but I understand he stays on his own, and 
has been having an increasingly difficult time maintaining his being on his own,
and police were asked to intervene, and they brought him to hospital at [facility].



MENTAL STATUS EXAMINATION:  Fair hygiene, he is obese, at present he is 
shirtless, has a long beard, long hair.  Cooperative, no agitation, no 
psychomotor retardation, tangential in thought, and speaks somewhat rapid, and 
at times difficult to understand.  Denies any thoughts of harming himself or 
anyone else.  Does not appear internally preoccupied but is paranoid.  He is 
alert, oriented to time, place, and person.  Intellect average.  No fluctuation 
of consciousness.  Judgment and insight are poor.



ASSESSMENT:  Schizophrenia.



PLAN:  He is admitted to the inpatient psychiatry unit, placed on relevant 
precautions, this is to help address his decompensation, the patient will be 
resumed on his previous medications, we will look at obtaining collateral 
information from the patients clinicians, the clinic, and, if possible, family 
members.



He will receive a medicine consult if indicated.



He will be discharged with followup once he is stable.  Anticipate a 5-7 day 
stay.



The patient will be seeing the assigned clinician tomorrow, when further 
recommendations will be made.  He agrees with the plan.  The assessment took 30 
minutes.

JACOBY

## 2020-09-11 NOTE — MHIPNPDOC
[facility] Progress Note


Progress Note


DATE OF SERVICE: 9/11/20


Subjective


HPI:


Lowell presents today for concerns regarding his schizophrenia. Patient has been 

very sleepy and awakens only for a short time. He does not have any difficulties

with bowel movements at this time. Patient has been observed walking around and 

engaging more. He has been more talkative but also has daytime sleepiness.


Objective


Appearance: Fair hygiene.





Behavior: Some thought blocking. Doesnt answer questions. Somewhat engaged.





Judgement: Poor.





Insight: Poor.


Assessment


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


Plan


Continue Clozaril 25 mg nightly


CBC to be drawn next week along with EKG due to multiple antipsychotics.


Group house bed to bed transfer would be ideal given patients current 

situation.





Vital Signs





Vital Signs








  Date Time  Temp Pulse Resp B/P (MAP) Pulse Ox O2 Delivery O2 Flow Rate FiO2


 


9/11/20 09:37  76  150/84    


 


9/11/20 06:47 97.7  16  96 Room Air  











Current Medications





Current Medications








 Medications


  (Trade)  Dose


 Ordered  Sig/Heather


 Route


 PRN Reason  Start Time


 Stop Time Status Last Admin


Dose Admin


 


 Acetaminophen


  (Tylenol Tab)  650 mg  Q6HP  PRN


 PO


 HEADACHE or DISCOMFORT  8/22/20 18:30


   Cancel  





 


 Al Hydrox/Mg


 Hydrox/Simethicone


  (Mylanta)  30 ml  Q4HP  PRN


 PO


 HEARTBURN/INDIGESTION  8/22/20 18:30


     





 


 Aripiprazole


  (AbiLIFY)  15 mg  BID


 PO


   8/22/20 21:00


    9/11/20 09:37





 


 Atorvastatin


 Calcium


  (Lipitor)  20 mg  DAILY


 PO


   8/23/20 09:00


    9/11/20 09:37





 


 Clozapine


  (Clozaril)  12.5 mg  QHS


 PO


   9/8/20 21:00


 9/9/20 17:10 DC 9/8/20 21:57





 


 Clozapine


  (Clozaril)  25 mg  QHS


 PO


   9/9/20 21:00


    9/10/20 20:05





 


 Diphenhydramine


 HCl


  (Benadryl)  50 mg  TID  PRN


 PO


 ANXIETY/AGITATION  8/22/20 20:00


    9/10/20 01:06





 


 Haloperidol


  (Haldol)  20 mg  QHS


 PO


   8/22/20 21:00


    9/10/20 20:05





 


 Home Med


  (Med Rec


 Complete!)    ASDIRECTED


 XX


   8/22/20 19:00


 8/22/20 18:55 DC  





 


 Ibuprofen


  (Advil)  600 mg  TID  PRN


 PO


 PAIN  8/22/20 20:00


    9/7/20 09:26





 


 Levothyroxine


 Sodium


  (Synthroid)  25 mcg  DAILY@0600


 PO


   8/23/20 06:00


    9/11/20 05:40





 


 Losartan Potassium


  (Cozaar)  25 mg  DAILY


 PO


   8/23/20 09:00


    9/11/20 09:37





 


 Magnesium


 Hydroxide


  (Milk Of


 Magnesia)  30 ml  DAILYPRN  PRN


 PO


 CONSTIPATION  8/22/20 18:30


     





 


 Metformin HCl


  (Glucophage Xr)  500 mg  BID@0800,1800


 PO


   8/22/20 18:00


    9/11/20 09:37





 


 Miscellaneous


  (Unresolved


 Clarification


 Entry)  SEE LABEL


 COMMENTS  DAILY


 XX


   9/11/20 09:00


     





 


 Non-Formulary


 Medication


  (** See Comment


 Field Below **)  SEE


 COMMENTS


 SECTION  1T@10


 XX


   9/3/20 10:00


 9/1/20 12:33 DC  





 


 Non-Formulary


 Medication


  (** See Comment


 Field Below **)  SEE


 COMMENTS


 SECTION  DAILY@1000


 XX


   9/5/20 10:00


 9/6/20 09:59 DC  





 


 Non-Formulary


 Medication


  (** See Comment


 Field Below **)  SEE LABEL


 COMMENTS  DAILY


 XX


   9/1/20 09:00


 9/1/20 13:42 DC  





 


 Non-Formulary


 Medication


  (** See Comment


 Field Below **)  SEE LABEL


 COMMENTS  DAILY


 XX


   9/2/20 09:00


 9/3/20 11:04 DC  





 


 Propranolol HCl


  (Inderal)  10 mg  TID


 PO


   8/22/20 21:00


    9/11/20 09:37





 


 Trazodone HCl


  (Desyrel)  50 mg  QHSP  PRN


 PO


 INSOMNIA  8/22/20 18:30


   Cancel  





 


 Zolpidem Tartrate


  (Ambien)  10 mg  QHSP  PRN


 PO


 Insomnia  8/22/20 20:00


 8/29/20 19:59 DC 8/28/20 20:51





 


 Zolpidem Tartrate


  (Ambien)  10 mg  QHSP  PRN


 PO


 Insomnia   9/4/20 23:00


    9/10/20 20:05














Allergies


Coded Allergies:  


     No Known Allergies (Unverified , 8/22/20)











DEBRA MARQUEZ DO              Sep 11, 2020 10:40

## 2020-09-12 VITALS — DIASTOLIC BLOOD PRESSURE: 77 MMHG | SYSTOLIC BLOOD PRESSURE: 130 MMHG

## 2020-09-12 VITALS — SYSTOLIC BLOOD PRESSURE: 157 MMHG | DIASTOLIC BLOOD PRESSURE: 80 MMHG

## 2020-09-12 RX ADMIN — ATORVASTATIN CALCIUM SCH MG: 20 TABLET, FILM COATED ORAL at 10:06

## 2020-09-12 RX ADMIN — LOSARTAN POTASSIUM SCH MG: 25 TABLET, FILM COATED ORAL at 10:11

## 2020-09-12 RX ADMIN — PROPRANOLOL HYDROCHLORIDE SCH MG: 10 TABLET ORAL at 10:12

## 2020-09-12 RX ADMIN — METFORMIN HYDROCHLORIDE SCH MG: 500 TABLET, EXTENDED RELEASE ORAL at 16:53

## 2020-09-12 RX ADMIN — PROPRANOLOL HYDROCHLORIDE SCH MG: 10 TABLET ORAL at 16:53

## 2020-09-12 RX ADMIN — ARIPIPRAZOLE SCH MG: 15 TABLET ORAL at 22:01

## 2020-09-12 RX ADMIN — METFORMIN HYDROCHLORIDE SCH MG: 500 TABLET, EXTENDED RELEASE ORAL at 10:09

## 2020-09-12 RX ADMIN — ARIPIPRAZOLE SCH MG: 15 TABLET ORAL at 10:06

## 2020-09-12 RX ADMIN — LEVOTHYROXINE SODIUM SCH MCG: 25 TABLET ORAL at 06:17

## 2020-09-12 RX ADMIN — CLOZAPINE SCH MG: 25 TABLET ORAL at 22:01

## 2020-09-12 RX ADMIN — PROPRANOLOL HYDROCHLORIDE SCH MG: 10 TABLET ORAL at 22:01

## 2020-09-13 VITALS — DIASTOLIC BLOOD PRESSURE: 72 MMHG | SYSTOLIC BLOOD PRESSURE: 132 MMHG

## 2020-09-13 VITALS — DIASTOLIC BLOOD PRESSURE: 68 MMHG | SYSTOLIC BLOOD PRESSURE: 139 MMHG

## 2020-09-13 RX ADMIN — CLOZAPINE SCH MG: 25 TABLET ORAL at 20:57

## 2020-09-13 RX ADMIN — ARIPIPRAZOLE SCH MG: 15 TABLET ORAL at 09:40

## 2020-09-13 RX ADMIN — LOSARTAN POTASSIUM SCH MG: 25 TABLET, FILM COATED ORAL at 09:40

## 2020-09-13 RX ADMIN — METFORMIN HYDROCHLORIDE SCH MG: 500 TABLET, EXTENDED RELEASE ORAL at 09:39

## 2020-09-13 RX ADMIN — PROPRANOLOL HYDROCHLORIDE SCH MG: 10 TABLET ORAL at 09:40

## 2020-09-13 RX ADMIN — ARIPIPRAZOLE SCH MG: 15 TABLET ORAL at 20:57

## 2020-09-13 RX ADMIN — PROPRANOLOL HYDROCHLORIDE SCH MG: 10 TABLET ORAL at 16:23

## 2020-09-13 RX ADMIN — METFORMIN HYDROCHLORIDE SCH MG: 500 TABLET, EXTENDED RELEASE ORAL at 17:37

## 2020-09-13 RX ADMIN — PROPRANOLOL HYDROCHLORIDE SCH MG: 10 TABLET ORAL at 20:58

## 2020-09-13 RX ADMIN — LEVOTHYROXINE SODIUM SCH MCG: 25 TABLET ORAL at 05:09

## 2020-09-13 RX ADMIN — IBUPROFEN PRN MG: 600 TABLET, FILM COATED ORAL at 20:59

## 2020-09-13 RX ADMIN — ATORVASTATIN CALCIUM SCH MG: 20 TABLET, FILM COATED ORAL at 09:40

## 2020-09-14 VITALS — DIASTOLIC BLOOD PRESSURE: 90 MMHG | SYSTOLIC BLOOD PRESSURE: 162 MMHG

## 2020-09-14 VITALS — DIASTOLIC BLOOD PRESSURE: 82 MMHG | SYSTOLIC BLOOD PRESSURE: 134 MMHG

## 2020-09-14 LAB
BASOPHILS # BLD AUTO: 0 10^3/UL (ref 0–0.2)
BASOPHILS NFR BLD AUTO: 0.3 % (ref 0–1)
EOSINOPHIL # BLD AUTO: 0.1 10^3/UL (ref 0–0.5)
EOSINOPHIL NFR BLD AUTO: 1.3 % (ref 0–3)
HCT VFR BLD AUTO: 43 % (ref 42–52)
HGB BLD-MCNC: 14.8 G/DL (ref 13.5–17.5)
LYMPHOCYTES # BLD AUTO: 2.6 10^3/UL (ref 1.5–5)
LYMPHOCYTES NFR BLD AUTO: 41.1 % (ref 24–44)
MCH RBC QN AUTO: 29.3 PG (ref 27–33)
MCHC RBC AUTO-ENTMCNC: 34.4 G/DL (ref 32–36.5)
MCV RBC AUTO: 85.1 FL (ref 80–96)
MONOCYTES # BLD AUTO: 0.7 10^3/UL (ref 0–0.8)
MONOCYTES NFR BLD AUTO: 11.6 % (ref 0–5)
NEUTROPHILS # BLD AUTO: 2.9 10^3/UL (ref 1.5–8.5)
NEUTROPHILS NFR BLD AUTO: 45.4 % (ref 36–66)
PLATELET # BLD AUTO: 215 10^3/UL (ref 150–450)
RBC # BLD AUTO: 5.05 10^6/UL (ref 4.3–6.1)
WBC # BLD AUTO: 6.3 10^3/UL (ref 4–10)

## 2020-09-14 RX ADMIN — LOSARTAN POTASSIUM SCH MG: 25 TABLET, FILM COATED ORAL at 08:51

## 2020-09-14 RX ADMIN — ARIPIPRAZOLE SCH MG: 15 TABLET ORAL at 08:52

## 2020-09-14 RX ADMIN — CLOZAPINE SCH MG: 25 TABLET ORAL at 21:33

## 2020-09-14 RX ADMIN — METFORMIN HYDROCHLORIDE SCH MG: 500 TABLET, EXTENDED RELEASE ORAL at 08:52

## 2020-09-14 RX ADMIN — PROPRANOLOL HYDROCHLORIDE SCH MG: 10 TABLET ORAL at 08:51

## 2020-09-14 RX ADMIN — LEVOTHYROXINE SODIUM SCH MCG: 25 TABLET ORAL at 05:42

## 2020-09-14 RX ADMIN — METFORMIN HYDROCHLORIDE SCH MG: 500 TABLET, EXTENDED RELEASE ORAL at 17:21

## 2020-09-14 RX ADMIN — ATORVASTATIN CALCIUM SCH MG: 20 TABLET, FILM COATED ORAL at 08:51

## 2020-09-14 RX ADMIN — ARIPIPRAZOLE SCH MG: 15 TABLET ORAL at 21:33

## 2020-09-14 RX ADMIN — PROPRANOLOL HYDROCHLORIDE SCH MG: 10 TABLET ORAL at 16:09

## 2020-09-14 RX ADMIN — PROPRANOLOL HYDROCHLORIDE SCH MG: 10 TABLET ORAL at 21:34

## 2020-09-14 NOTE — MHIPNPDOC
[facility] Progress Note


Progress Note


DATE OF SERVICE: 9/14/20


Subjective


HPI:


Patient I met with today. He reports that he is doing well and that his voices 

are a lot less loud. Reports hes feeling better. Hes been noticeably more 

active. Still only wants to meet in the public areas. The patient denies any SI 

or HI. Reports hes feeling better, but still having some delusional thinking 

per nursing staff.


Objective


Speech: Spontaneous and Fluid. Normal rate. Normal volume.





Thought Form: Linear and goal directed.





Thought Content: Voices are improving.





Judgement: Poor.





Insight: Poor.


Assessment


F20.9 Schizophrenia, unspecified


Plan


Increase Clozaril to 50 mg nightly.


Order CBC and EKG to monitor heart rhythms.





Vital Signs





Vital Signs








  Date Time  Temp Pulse Resp B/P (MAP) Pulse Ox O2 Delivery O2 Flow Rate FiO2


 


9/14/20 08:51  80  142/96    


 


9/14/20 06:01 98.4  18  94   


 


9/12/20 06:35      Room Air  











Laboratory Data


24H Labs


Laboratory Tests 2


9/13/20 16:20: Bedside Glucose (Misc Panel) 159H


9/14/20 05:45: Bedside Glucose (Misc Panel) 136H





Current Medications





Current Medications








 Medications


  (Trade)  Dose


 Ordered  Sig/Heather


 Route


 PRN Reason  Start Time


 Stop Time Status Last Admin


Dose Admin


 


 Acetaminophen


  (Tylenol Tab)  650 mg  Q6HP  PRN


 PO


 HEADACHE or DISCOMFORT  8/22/20 18:30


   Cancel  





 


 Al Hydrox/Mg


 Hydrox/Simethicone


  (Mylanta)  30 ml  Q4HP  PRN


 PO


 HEARTBURN/INDIGESTION  8/22/20 18:30


     





 


 Aripiprazole


  (AbiLIFY)  15 mg  BID


 PO


   8/22/20 21:00


    9/14/20 08:52





 


 Atorvastatin


 Calcium


  (Lipitor)  20 mg  DAILY


 PO


   8/23/20 09:00


    9/14/20 08:51





 


 Clozapine


  (Clozaril)  12.5 mg  QHS


 PO


   9/8/20 21:00


 9/9/20 17:10 DC 9/8/20 21:57





 


 Clozapine


  (Clozaril)  25 mg  QHS


 PO


   9/9/20 21:00


    9/13/20 20:57





 


 Diphenhydramine


 HCl


  (Benadryl)  50 mg  TID  PRN


 PO


 ANXIETY/AGITATION  8/22/20 20:00


    9/10/20 01:06





 


 Haloperidol


  (Haldol)  20 mg  QHS


 PO


   8/22/20 21:00


    9/13/20 20:57





 


 Home Med


  (Med Rec


 Complete!)    ASDIRECTED


 XX


   8/22/20 19:00


 8/22/20 18:55 DC  





 


 Ibuprofen


  (Advil)  600 mg  TID  PRN


 PO


 PAIN  8/22/20 20:00


    9/13/20 20:59





 


 Levothyroxine


 Sodium


  (Synthroid)  25 mcg  DAILY@0600


 PO


   8/23/20 06:00


    9/14/20 05:42





 


 Losartan Potassium


  (Cozaar)  25 mg  DAILY


 PO


   8/23/20 09:00


    9/14/20 08:51





 


 Magnesium


 Hydroxide


  (Milk Of


 Magnesia)  30 ml  DAILYPRN  PRN


 PO


 CONSTIPATION  8/22/20 18:30


     





 


 Metformin HCl


  (Glucophage Xr)  500 mg  BID@0800,1800


 PO


   8/22/20 18:00


    9/14/20 08:52





 


 Miscellaneous


  (Unresolved


 Clarification


 Entry)  SEE LABEL


 COMMENTS  DAILY


 XX


   9/11/20 09:00


 9/11/20 14:36 DC  





 


 Non-Formulary


 Medication


  (** See Comment


 Field Below **)  SEE


 COMMENTS


 SECTION  1T@10


 XX


   9/3/20 10:00


 9/1/20 12:33 DC  





 


 Non-Formulary


 Medication


  (** See Comment


 Field Below **)  SEE


 COMMENTS


 SECTION  DAILY@1000


 XX


   9/5/20 10:00


 9/6/20 09:59 DC  





 


 Non-Formulary


 Medication


  (** See Comment


 Field Below **)  SEE LABEL


 COMMENTS  DAILY


 XX


   9/1/20 09:00


 9/1/20 13:42 DC  





 


 Non-Formulary


 Medication


  (** See Comment


 Field Below **)  SEE LABEL


 COMMENTS  DAILY


 XX


   9/2/20 09:00


 9/3/20 11:04 DC  





 


 Propranolol HCl


  (Inderal)  10 mg  TID


 PO


   8/22/20 21:00


    9/14/20 08:51





 


 Trazodone HCl


  (Desyrel)  50 mg  QHSP  PRN


 PO


 INSOMNIA  8/22/20 18:30


   Cancel  





 


 Zolpidem Tartrate


  (Ambien)  10 mg  QHSP  PRN


 PO


 Insomnia  8/22/20 20:00


 8/29/20 19:59 DC 8/28/20 20:51





 


 Zolpidem Tartrate


  (Ambien)  10 mg  QHSP  PRN


 PO


 Insomnia   9/4/20 23:00


    9/13/20 20:59














Allergies


Coded Allergies:  


     No Known Allergies (Unverified , 8/22/20)











DEBRA MARQUEZ DO              Sep 14, 2020 10:12

## 2020-09-15 VITALS — DIASTOLIC BLOOD PRESSURE: 68 MMHG | SYSTOLIC BLOOD PRESSURE: 140 MMHG

## 2020-09-15 VITALS — DIASTOLIC BLOOD PRESSURE: 86 MMHG | SYSTOLIC BLOOD PRESSURE: 136 MMHG

## 2020-09-15 RX ADMIN — PROPRANOLOL HYDROCHLORIDE SCH MG: 10 TABLET ORAL at 21:16

## 2020-09-15 RX ADMIN — LOSARTAN POTASSIUM SCH MG: 25 TABLET, FILM COATED ORAL at 09:59

## 2020-09-15 RX ADMIN — ARIPIPRAZOLE SCH MG: 15 TABLET ORAL at 09:58

## 2020-09-15 RX ADMIN — PROPRANOLOL HYDROCHLORIDE SCH MG: 10 TABLET ORAL at 15:36

## 2020-09-15 RX ADMIN — CLOZAPINE SCH MG: 25 TABLET ORAL at 21:15

## 2020-09-15 RX ADMIN — DOCUSATE SODIUM,SENNOSIDES SCH TAB: 50; 8.6 TABLET, FILM COATED ORAL at 09:00

## 2020-09-15 RX ADMIN — ARIPIPRAZOLE SCH MG: 15 TABLET ORAL at 21:15

## 2020-09-15 RX ADMIN — METFORMIN HYDROCHLORIDE SCH MG: 500 TABLET, EXTENDED RELEASE ORAL at 18:09

## 2020-09-15 RX ADMIN — LEVOTHYROXINE SODIUM SCH MCG: 25 TABLET ORAL at 06:23

## 2020-09-15 RX ADMIN — ATORVASTATIN CALCIUM SCH MG: 20 TABLET, FILM COATED ORAL at 09:59

## 2020-09-15 RX ADMIN — PROPRANOLOL HYDROCHLORIDE SCH MG: 10 TABLET ORAL at 09:59

## 2020-09-15 RX ADMIN — METFORMIN HYDROCHLORIDE SCH MG: 500 TABLET, EXTENDED RELEASE ORAL at 07:28

## 2020-09-15 NOTE — MHIPNPDOC
[facility] Progress Note


Progress Note


DATE OF SERVICE: 9/15/20


Subjective


HPI:


Lowell presents today for concerns regarding his psych issues. Patient was met 

with today and reports that he is doing good. Denies any gastrointestinal 

issues, fevers or cough. States that the voices that he has been hearing for 30 

years are almost gone. Denies any suicidal or homicidal thoughts.





MEDICATIONS:


Reports that the increased dose of clozapine has been working for him.


Objective


Appearance: Appears to be stated age. Well groomed. Well nourished.





Behavior: Cooperative with good eye contact. Pleasant. Engaged.





Affect: Full range. Appropriate to context. More active.





Mood: Appropriately reactive. Euthymic. Generally good.





Cognition: Alert, Attentive, and Oriented to person, place, time. Appears to be 

improving.





Thought Form: Linear and goal directed.





Thought Content: No thoughts of self harm. No evidence of delusions. No evidence

of aggressive or homicidal ideation. Voices nearly gone. No evidence of suicidal

ideation.





Judgement: Intact as evidenced by decision making in the recent past. Logical.


Assessment


R44.0 Auditory hallucinations


F20.9 Schizophrenia, unspecified


Plan


Patient reported that he was lonely. Advised him to join some group, however, he

denied.


Continue Clozaril 50 mg nightly and Abilify. Added bowel prophylaxis.





Vital Signs





Vital Signs








  Date Time  Temp Pulse Resp B/P (MAP) Pulse Ox O2 Delivery O2 Flow Rate FiO2


 


9/15/20 09:59  96  132/84    


 


9/15/20 06:28 97.8  18     


 


9/14/20 06:01     94   


 


9/12/20 06:35      Room Air  











Laboratory Data


24H Labs


Laboratory Tests 2


9/14/20 17:20: Bedside Glucose (Misc Panel) 101


9/15/20 06:25: Bedside Glucose (Misc Panel) 133H





Current Medications





Current Medications








 Medications


  (Trade)  Dose


 Ordered  Sig/Heather


 Route


 PRN Reason  Start Time


 Stop Time Status Last Admin


Dose Admin


 


 Acetaminophen


  (Tylenol Tab)  650 mg  Q6HP  PRN


 PO


 HEADACHE or DISCOMFORT  8/22/20 18:30


   Cancel  





 


 Al Hydrox/Mg


 Hydrox/Simethicone


  (Mylanta)  30 ml  Q4HP  PRN


 PO


 HEARTBURN/INDIGESTION  8/22/20 18:30


     





 


 Aripiprazole


  (AbiLIFY)  15 mg  BID


 PO


   8/22/20 21:00


    9/15/20 09:58





 


 Atorvastatin


 Calcium


  (Lipitor)  20 mg  DAILY


 PO


   8/23/20 09:00


    9/15/20 09:59





 


 Clozapine


  (Clozaril)  12.5 mg  QHS


 PO


   9/8/20 21:00


 9/9/20 17:10 DC 9/8/20 21:57





 


 Clozapine


  (Clozaril)  25 mg  QHS


 PO


   9/9/20 21:00


 9/14/20 11:26 DC 9/13/20 20:57





 


 Clozapine


  (Clozaril)  50 mg  QHS


 PO


   9/14/20 21:00


    9/14/20 21:33





 


 Diphenhydramine


 HCl


  (Benadryl)  50 mg  TID  PRN


 PO


 ANXIETY/AGITATION  8/22/20 20:00


    9/10/20 01:06





 


 Haloperidol


  (Haldol)  20 mg  QHS


 PO


   8/22/20 21:00


    9/14/20 21:33





 


 Home Med


  (Med Rec


 Complete!)    ASDIRECTED


 XX


   8/22/20 19:00


 8/22/20 18:55 DC  





 


 Ibuprofen


  (Advil)  600 mg  TID  PRN


 PO


 PAIN  8/22/20 20:00


    9/13/20 20:59





 


 Levothyroxine


 Sodium


  (Synthroid)  25 mcg  DAILY@0600


 PO


   8/23/20 06:00


    9/15/20 06:23





 


 Losartan Potassium


  (Cozaar)  25 mg  DAILY


 PO


   8/23/20 09:00


    9/15/20 09:59





 


 Magnesium


 Hydroxide


  (Milk Of


 Magnesia)  30 ml  DAILYPRN  PRN


 PO


 CONSTIPATION  8/22/20 18:30


     





 


 Metformin HCl


  (Glucophage Xr)  500 mg  BID@0800,1800


 PO


   8/22/20 18:00


    9/15/20 07:28





 


 Miscellaneous


  (Unresolved


 Clarification


 Entry)  SEE LABEL


 COMMENTS  DAILY


 XX


   9/11/20 09:00


 9/11/20 14:36 DC  





 


 Non-Formulary


 Medication


  (** See Comment


 Field Below **)  SEE


 COMMENTS


 SECTION  1T@10


 XX


   9/3/20 10:00


 9/1/20 12:33 DC  





 


 Non-Formulary


 Medication


  (** See Comment


 Field Below **)  SEE


 COMMENTS


 SECTION  DAILY@1000


 XX


   9/5/20 10:00


 9/6/20 09:59 DC  





 


 Non-Formulary


 Medication


  (** See Comment


 Field Below **)  SEE LABEL


 COMMENTS  DAILY


 XX


   9/1/20 09:00


 9/1/20 13:42 DC  





 


 Non-Formulary


 Medication


  (** See Comment


 Field Below **)  SEE LABEL


 COMMENTS  DAILY


 XX


   9/2/20 09:00


 9/3/20 11:04 DC  





 


 Propranolol HCl


  (Inderal)  10 mg  TID


 PO


   8/22/20 21:00


    9/15/20 09:59





 


 Trazodone HCl


  (Desyrel)  50 mg  QHSP  PRN


 PO


 INSOMNIA  8/22/20 18:30


   Cancel  





 


 Zolpidem Tartrate


  (Ambien)  10 mg  QHSP  PRN


 PO


 Insomnia  8/22/20 20:00


 8/29/20 19:59 DC 8/28/20 20:51





 


 Zolpidem Tartrate


  (Ambien)  10 mg  QHSP  PRN


 PO


 Insomnia   9/4/20 23:00


    9/14/20 21:35














Allergies


Coded Allergies:  


     No Known Allergies (Unverified , 8/22/20)











DEBRA MARQUEZ DO              Sep 15, 2020 14:48

## 2020-09-16 VITALS — SYSTOLIC BLOOD PRESSURE: 148 MMHG | DIASTOLIC BLOOD PRESSURE: 80 MMHG

## 2020-09-16 VITALS — DIASTOLIC BLOOD PRESSURE: 98 MMHG | SYSTOLIC BLOOD PRESSURE: 142 MMHG

## 2020-09-16 RX ADMIN — PROPRANOLOL HYDROCHLORIDE SCH MG: 10 TABLET ORAL at 09:44

## 2020-09-16 RX ADMIN — PROPRANOLOL HYDROCHLORIDE SCH MG: 10 TABLET ORAL at 21:14

## 2020-09-16 RX ADMIN — METFORMIN HYDROCHLORIDE SCH MG: 500 TABLET, EXTENDED RELEASE ORAL at 07:36

## 2020-09-16 RX ADMIN — DOCUSATE SODIUM,SENNOSIDES SCH TAB: 50; 8.6 TABLET, FILM COATED ORAL at 09:00

## 2020-09-16 RX ADMIN — ARIPIPRAZOLE SCH MG: 15 TABLET ORAL at 21:13

## 2020-09-16 RX ADMIN — METFORMIN HYDROCHLORIDE SCH MG: 500 TABLET, EXTENDED RELEASE ORAL at 17:14

## 2020-09-16 RX ADMIN — PROPRANOLOL HYDROCHLORIDE SCH MG: 10 TABLET ORAL at 15:19

## 2020-09-16 RX ADMIN — ATORVASTATIN CALCIUM SCH MG: 20 TABLET, FILM COATED ORAL at 09:44

## 2020-09-16 RX ADMIN — LOSARTAN POTASSIUM SCH MG: 25 TABLET, FILM COATED ORAL at 09:44

## 2020-09-16 RX ADMIN — LEVOTHYROXINE SODIUM SCH MCG: 25 TABLET ORAL at 06:28

## 2020-09-16 RX ADMIN — CLOZAPINE SCH MG: 25 TABLET ORAL at 21:13

## 2020-09-16 RX ADMIN — DIPHENHYDRAMINE HYDROCHLORIDE PRN MG: 50 CAPSULE ORAL at 00:53

## 2020-09-16 RX ADMIN — ARIPIPRAZOLE SCH MG: 15 TABLET ORAL at 09:44

## 2020-09-16 NOTE — MHIPNPDOC
[facility] Progress Note


Progress Note


DATE OF SERVICE: 9/16/20


Subjective


HPI:


The patient is attempted to be met with today, however, he is very tired today. 

Although he didnt awaken for the initial visit, he was known to be walking 

around in no acute distress. He still answers in yes & no answers with very 

little detail given. However, he still has reportedly been telling nurses that 

hes been improving.


Objective


Appearance: Hygiene fair.





Behavior: Pleasant. Cooperative with good eye contact. Engaged.





Motor: More active. Sedation not present on 2nd interview.





Thought Content: No evidence of suicidal ideation. No evidence of delusions. No 

evidence of aggressive or homicidal ideation. No thoughts of self harm.





Judgement: Appear to be improving.





Insight: Appear to be improving.


Assessment


Plan


Continue Clozapine with bowel prophylaxis 50 mg.


CBC within normal limits.


Making some improvements. Will see about triaging back to [facility] as hes

made good progress.





Vital Signs





Vital Signs








  Date Time  Temp Pulse Resp B/P (MAP) Pulse Ox O2 Delivery O2 Flow Rate FiO2


 


9/16/20 06:11 97.6 86 18 142/98 (113)    


 


9/14/20 06:01     94   


 


9/12/20 06:35      Room Air  











Laboratory Data


24H Labs


Laboratory Tests 2


9/15/20 16:56: Bedside Glucose (Misc Panel) 115H


9/16/20 06:28: Bedside Glucose (Misc Panel) 123H





Current Medications





Current Medications








 Medications


  (Trade)  Dose


 Ordered  Sig/Heather


 Route


 PRN Reason  Start Time


 Stop Time Status Last Admin


Dose Admin


 


 Acetaminophen


  (Tylenol Tab)  650 mg  Q6HP  PRN


 PO


 HEADACHE or DISCOMFORT  8/22/20 18:30


   Cancel  





 


 Al Hydrox/Mg


 Hydrox/Simethicone


  (Mylanta)  30 ml  Q4HP  PRN


 PO


 HEARTBURN/INDIGESTION  8/22/20 18:30


     





 


 Aripiprazole


  (AbiLIFY)  15 mg  BID


 PO


   8/22/20 21:00


    9/15/20 21:15





 


 Atorvastatin


 Calcium


  (Lipitor)  20 mg  DAILY


 PO


   8/23/20 09:00


    9/15/20 09:59





 


 Clozapine


  (Clozaril)  12.5 mg  QHS


 PO


   9/8/20 21:00


 9/9/20 17:10 DC 9/8/20 21:57





 


 Clozapine


  (Clozaril)  25 mg  QHS


 PO


   9/9/20 21:00


 9/14/20 11:26 DC 9/13/20 20:57





 


 Clozapine


  (Clozaril)  50 mg  QHS


 PO


   9/14/20 21:00


    9/15/20 21:15





 


 Diphenhydramine


 HCl


  (Benadryl)  50 mg  TID  PRN


 PO


 ANXIETY/AGITATION  8/22/20 20:00


    9/16/20 00:53





 


 Haloperidol


  (Haldol)  20 mg  QHS


 PO


   8/22/20 21:00


    9/15/20 21:15





 


 Home Med


  (Med Rec


 Complete!)    ASDIRECTED


 XX


   8/22/20 19:00


 8/22/20 18:55 DC  





 


 Ibuprofen


  (Advil)  600 mg  TID  PRN


 PO


 PAIN  8/22/20 20:00


    9/13/20 20:59





 


 Levothyroxine


 Sodium


  (Synthroid)  25 mcg  DAILY@0600


 PO


   8/23/20 06:00


    9/16/20 06:28





 


 Losartan Potassium


  (Cozaar)  25 mg  DAILY


 PO


   8/23/20 09:00


    9/15/20 09:59





 


 Magnesium


 Hydroxide


  (Milk Of


 Magnesia)  30 ml  DAILYPRN  PRN


 PO


 CONSTIPATION  8/22/20 18:30


     





 


 Metformin HCl


  (Glucophage Xr)  500 mg  BID@0800,1800


 PO


   8/22/20 18:00


    9/16/20 07:36





 


 Miscellaneous


  (Unresolved


 Clarification


 Entry)  SEE LABEL


 COMMENTS  DAILY


 XX


   9/11/20 09:00


 9/11/20 14:36 DC  





 


 Non-Formulary


 Medication


  (** See Comment


 Field Below **)  SEE


 COMMENTS


 SECTION  1T@10


 XX


   9/3/20 10:00


 9/1/20 12:33 DC  





 


 Non-Formulary


 Medication


  (** See Comment


 Field Below **)  SEE


 COMMENTS


 SECTION  DAILY@1000


 XX


   9/5/20 10:00


 9/6/20 09:59 DC  





 


 Non-Formulary


 Medication


  (** See Comment


 Field Below **)  SEE LABEL


 COMMENTS  DAILY


 XX


   9/1/20 09:00


 9/1/20 13:42 DC  





 


 Non-Formulary


 Medication


  (** See Comment


 Field Below **)  SEE LABEL


 COMMENTS  DAILY


 XX


   9/2/20 09:00


 9/3/20 11:04 DC  





 


 Propranolol HCl


  (Inderal)  10 mg  TID


 PO


   8/22/20 21:00


    9/15/20 21:16





 


 Senna/Docusate


 Sodium


  (Senokot S)  1 tab  DAILY


 PO


   9/15/20 09:00


     





 


 Trazodone HCl


  (Desyrel)  50 mg  QHSP  PRN


 PO


 INSOMNIA  8/22/20 18:30


   Cancel  





 


 Zolpidem Tartrate


  (Ambien)  10 mg  QHSP  PRN


 PO


 Insomnia  8/22/20 20:00


 8/29/20 19:59 DC 8/28/20 20:51





 


 Zolpidem Tartrate


  (Ambien)  10 mg  QHSP  PRN


 PO


 Insomnia   9/4/20 23:00


    9/15/20 21:16














Allergies


Coded Allergies:  


     No Known Allergies (Unverified , 8/22/20)











DEBRA MARQUEZ DO              Sep 16, 2020 09:44

## 2020-09-17 VITALS — DIASTOLIC BLOOD PRESSURE: 90 MMHG | SYSTOLIC BLOOD PRESSURE: 148 MMHG

## 2020-09-17 VITALS — DIASTOLIC BLOOD PRESSURE: 77 MMHG | SYSTOLIC BLOOD PRESSURE: 145 MMHG

## 2020-09-17 RX ADMIN — MODAFINIL SCH MG: 100 TABLET ORAL at 11:14

## 2020-09-17 RX ADMIN — PROPRANOLOL HYDROCHLORIDE SCH MG: 10 TABLET ORAL at 16:17

## 2020-09-17 RX ADMIN — CLOZAPINE SCH MG: 25 TABLET ORAL at 21:53

## 2020-09-17 RX ADMIN — Medication PRN EACH: at 11:14

## 2020-09-17 RX ADMIN — LOSARTAN POTASSIUM SCH MG: 25 TABLET, FILM COATED ORAL at 09:13

## 2020-09-17 RX ADMIN — METFORMIN HYDROCHLORIDE SCH MG: 500 TABLET, EXTENDED RELEASE ORAL at 18:10

## 2020-09-17 RX ADMIN — ARIPIPRAZOLE SCH MG: 15 TABLET ORAL at 09:14

## 2020-09-17 RX ADMIN — PROPRANOLOL HYDROCHLORIDE SCH MG: 10 TABLET ORAL at 09:15

## 2020-09-17 RX ADMIN — CLOZAPINE SCH MG: 25 TABLET ORAL at 11:13

## 2020-09-17 RX ADMIN — ARIPIPRAZOLE SCH MG: 15 TABLET ORAL at 21:53

## 2020-09-17 RX ADMIN — ATORVASTATIN CALCIUM SCH MG: 20 TABLET, FILM COATED ORAL at 09:15

## 2020-09-17 RX ADMIN — PROPRANOLOL HYDROCHLORIDE SCH MG: 10 TABLET ORAL at 21:54

## 2020-09-17 RX ADMIN — LEVOTHYROXINE SODIUM SCH MCG: 25 TABLET ORAL at 06:06

## 2020-09-17 RX ADMIN — METFORMIN HYDROCHLORIDE SCH MG: 500 TABLET, EXTENDED RELEASE ORAL at 09:15

## 2020-09-17 RX ADMIN — DOCUSATE SODIUM,SENNOSIDES SCH TAB: 50; 8.6 TABLET, FILM COATED ORAL at 09:00

## 2020-09-17 NOTE — MHIPNPDOC
[facility]


Progress Note


DATE OF SERVICE: 9/17/20


Subjective


HPI:


Lowell was attempted to be seen today, however, hes quite sedated on 

observation. He has notably been more active, but still has significant di

fficulties with falling asleep suddenly and being very difficult to arouse. He 

was sleeping comfortable breathing well in no acute distress, however, very 

difficult to awaken and sleepy during morning. He continues to refuse the 

colloids, but has had no notable troubles with bowel movements as per the 

nursing staff, who continues to monitor him.





TESTS:


CBC is within normal limits and EKG will be done soon.


Objective


Appearance: Well groomed. No acute distress. Well nourished. Appears to be 

stated age.





Behavior: Cooperative with good eye contact. Engaged. When patient is wake, he 

appears to be talking to various unseen others. Reportedly, the nursing staff 

said he was holding court in the middle of the night talking to various unseen

others. Pleasant.


Assessment


F20.9 Schizophrenia, unspecified


Plan


Increased dose of Clozapine to 25 mg daily and 50 mg at night. Prescribed 

Modafinil 15 mg in the morning, to improve his awakeness.


Referred patient to long-term care at [facility] as he is still far from his [facility], reportedly.





Vital Signs





Vital Signs








  Date Time  Temp Pulse Resp B/P (MAP) Pulse Ox O2 Delivery O2 Flow Rate FiO2


 


9/17/20 09:15  81  148/90    


 


9/17/20 08:21      Room Air  


 


9/17/20 06:52 97.5  15  92   











Laboratory Data


24H Labs


Laboratory Tests 2


9/16/20 17:12: Bedside Glucose (Misc Panel) 135H


9/17/20 06:22: Bedside Glucose (Misc Panel) 125H





Current Medications





Current Medications








 Medications


  (Trade)  Dose


 Ordered  Sig/Heather


 Route


 PRN Reason  Start Time


 Stop Time Status Last Admin


Dose Admin


 


 Acetaminophen


  (Tylenol Tab)  650 mg  Q6HP  PRN


 PO


 HEADACHE or DISCOMFORT  8/22/20 18:30


   Cancel  





 


 Al Hydrox/Mg


 Hydrox/Simethicone


  (Mylanta)  30 ml  Q4HP  PRN


 PO


 HEARTBURN/INDIGESTION  8/22/20 18:30


     





 


 Aripiprazole


  (AbiLIFY)  15 mg  BID


 PO


   8/22/20 21:00


    9/17/20 09:14





 


 Atorvastatin


 Calcium


  (Lipitor)  20 mg  DAILY


 PO


   8/23/20 09:00


    9/17/20 09:15





 


 Clozapine


  (Clozaril)  12.5 mg  QHS


 PO


   9/8/20 21:00


 9/9/20 17:10 DC 9/8/20 21:57





 


 Clozapine


  (Clozaril)  25 mg  QHS


 PO


   9/9/20 21:00


 9/14/20 11:26 DC 9/13/20 20:57





 


 Clozapine


  (Clozaril)  50 mg  QHS


 PO


   9/14/20 21:00


    9/16/20 21:13





 


 Diphenhydramine


 HCl


  (Benadryl)  50 mg  TID  PRN


 PO


 ANXIETY/AGITATION  8/22/20 20:00


    9/16/20 00:53





 


 Haloperidol


  (Haldol)  20 mg  QHS


 PO


   8/22/20 21:00


    9/16/20 21:13





 


 Home Med


  (Med Rec


 Complete!)    ASDIRECTED


 XX


   8/22/20 19:00


 8/22/20 18:55 DC  





 


 Ibuprofen


  (Advil)  600 mg  TID  PRN


 PO


 PAIN  8/22/20 20:00


    9/13/20 20:59





 


 Levothyroxine


 Sodium


  (Synthroid)  25 mcg  DAILY@0600


 PO


   8/23/20 06:00


    9/17/20 06:06





 


 Losartan Potassium


  (Cozaar)  25 mg  DAILY


 PO


   8/23/20 09:00


    9/17/20 09:13





 


 Magnesium


 Hydroxide


  (Milk Of


 Magnesia)  30 ml  DAILYPRN  PRN


 PO


 CONSTIPATION  8/22/20 18:30


     





 


 Metformin HCl


  (Glucophage Xr)  500 mg  BID@0800,1800


 PO


   8/22/20 18:00


    9/17/20 09:15





 


 Miscellaneous


  (Unresolved


 Clarification


 Entry)  SEE LABEL


 COMMENTS  DAILY


 XX


   9/11/20 09:00


 9/11/20 14:36 DC  





 


 Non-Formulary


 Medication


  (** See Comment


 Field Below **)  SEE


 COMMENTS


 SECTION  1T@10


 XX


   9/3/20 10:00


 9/1/20 12:33 DC  





 


 Non-Formulary


 Medication


  (** See Comment


 Field Below **)  SEE


 COMMENTS


 SECTION  DAILY@1000


 XX


   9/5/20 10:00


 9/6/20 09:59 DC  





 


 Non-Formulary


 Medication


  (** See Comment


 Field Below **)  SEE LABEL


 COMMENTS  DAILY


 XX


   9/1/20 09:00


 9/1/20 13:42 DC  





 


 Non-Formulary


 Medication


  (** See Comment


 Field Below **)  SEE LABEL


 COMMENTS  DAILY


 XX


   9/2/20 09:00


 9/3/20 11:04 DC  





 


 Propranolol HCl


  (Inderal)  10 mg  TID


 PO


   8/22/20 21:00


    9/17/20 09:15





 


 Senna/Docusate


 Sodium


  (Senokot S)  1 tab  DAILY


 PO


   9/15/20 09:00


     





 


 Trazodone HCl


  (Desyrel)  50 mg  QHSP  PRN


 PO


 INSOMNIA  8/22/20 18:30


   Cancel  





 


 Zolpidem Tartrate


  (Ambien)  10 mg  QHSP  PRN


 PO


 Insomnia  8/22/20 20:00


 8/29/20 19:59 DC 8/28/20 20:51





 


 Zolpidem Tartrate


  (Ambien)  10 mg  QHSP  PRN


 PO


 Insomnia   9/4/20 23:00


    9/16/20 21:13














Allergies


Coded Allergies:  


     No Known Allergies (Unverified , 8/22/20)











DEBRA MARQUEZ DO              Sep 17, 2020 09:28

## 2020-09-18 VITALS — DIASTOLIC BLOOD PRESSURE: 96 MMHG | SYSTOLIC BLOOD PRESSURE: 149 MMHG

## 2020-09-18 VITALS — DIASTOLIC BLOOD PRESSURE: 89 MMHG | SYSTOLIC BLOOD PRESSURE: 150 MMHG

## 2020-09-18 RX ADMIN — LEVOTHYROXINE SODIUM SCH MCG: 25 TABLET ORAL at 06:11

## 2020-09-18 RX ADMIN — ARIPIPRAZOLE SCH MG: 15 TABLET ORAL at 09:40

## 2020-09-18 RX ADMIN — PROPRANOLOL HYDROCHLORIDE SCH MG: 10 TABLET ORAL at 20:53

## 2020-09-18 RX ADMIN — PROPRANOLOL HYDROCHLORIDE SCH MG: 10 TABLET ORAL at 09:40

## 2020-09-18 RX ADMIN — METFORMIN HYDROCHLORIDE SCH MG: 500 TABLET, EXTENDED RELEASE ORAL at 17:07

## 2020-09-18 RX ADMIN — METFORMIN HYDROCHLORIDE SCH MG: 500 TABLET, EXTENDED RELEASE ORAL at 09:40

## 2020-09-18 RX ADMIN — CLOZAPINE SCH MG: 25 TABLET ORAL at 09:43

## 2020-09-18 RX ADMIN — MODAFINIL SCH MG: 100 TABLET ORAL at 09:41

## 2020-09-18 RX ADMIN — ARIPIPRAZOLE SCH MG: 15 TABLET ORAL at 20:53

## 2020-09-18 RX ADMIN — DIPHENHYDRAMINE HYDROCHLORIDE PRN MG: 50 CAPSULE ORAL at 03:45

## 2020-09-18 RX ADMIN — DOCUSATE SODIUM,SENNOSIDES SCH TAB: 50; 8.6 TABLET, FILM COATED ORAL at 09:00

## 2020-09-18 RX ADMIN — LOSARTAN POTASSIUM SCH MG: 25 TABLET, FILM COATED ORAL at 09:43

## 2020-09-18 RX ADMIN — CLOZAPINE SCH MG: 25 TABLET ORAL at 20:54

## 2020-09-18 RX ADMIN — ATORVASTATIN CALCIUM SCH MG: 20 TABLET, FILM COATED ORAL at 09:41

## 2020-09-18 RX ADMIN — PROPRANOLOL HYDROCHLORIDE SCH MG: 10 TABLET ORAL at 15:44

## 2020-09-18 NOTE — ECGEPIP
[facility]

                                       

                                       Test Date:    2020

Pat Name:     GALINA CHAPMAN            Department:   

Patient ID:   [id number]                 Room:         [place] [address]

Gender:       Male                     Technician:   SHON

:          1970               Requested By: DEBRA MARQUEZ 

Order Number: [id number]     Reading MD:   Javi Norton

                                 Measurements

Intervals                              Axis          

Rate:         65                       P:            24

MO:           166                      QRS:          69

QRSD:         113                      T:            54

QT:           384                                    

QTc:          401                                    

                           Interpretive Statements

SINUS RHYTHM

IVCD

NO PREVIOUS

SEE SCANNED DOWNTIME REPORT

## 2020-09-18 NOTE — MHIPNPDOC
[facility] Progress Note


Progress Note


DATE OF SERVICE: 9/18/20


Subjective


HPI:


the patient was attempted to be met with again, he was quite tired during the 

daytime sleeping in the chair, he did awaken later and he was met with briefly, 

he reports that he still has difficulty sleeping, but prefers sleeping in the 

chair.  He reports he stays up all night.  He reports he feels the medicines 

helping and denies any constipation.





TESTS:


CBC is within normal limits and EKG will be done soon.


Objective


Appearance: Well groomed. No acute distress. Well nourished. Appears to be 

stated age.





Behavior: Cooperative with good eye contact. Engaged. When patient is wake, he 

appears to be talking to various unseen others. . Pleasant.however, appears 

tangential at times and response to unseen others.


Assessment


F20.9 Schizophrenia, unspecified


Plan


continue dose of Clozapine to 25 mg daily and 50 mg at night. continue Modafinil

50 mg in the morning, to improve his awakeness.


Referred patient to long-term care at [facility] as he is still far from his 

baseline, reportedly.





Vital Signs





Vital Signs








  Date Time  Temp Pulse Resp B/P (MAP) Pulse Ox O2 Delivery O2 Flow Rate FiO2


 


9/18/20 09:43    154/98    


 


9/18/20 09:40  94      


 


9/18/20 06:51 97.1  20   Room Air  


 


9/17/20 06:52     92   











Laboratory Data


24H Labs


Laboratory Tests 2


9/18/20 06:14: Bedside Glucose (Misc Panel) 117H





Current Medications





Current Medications








 Medications


  (Trade)  Dose


 Ordered  Sig/Heather


 Route


 PRN Reason  Start Time


 Stop Time Status Last Admin


Dose Admin


 


 Acetaminophen


  (Tylenol Tab)  650 mg  Q6HP  PRN


 PO


 HEADACHE or DISCOMFORT  8/22/20 18:30


   Cancel  





 


 Al Hydrox/Mg


 Hydrox/Simethicone


  (Mylanta)  30 ml  Q4HP  PRN


 PO


 HEARTBURN/INDIGESTION  8/22/20 18:30


     





 


 Aripiprazole


  (AbiLIFY)  15 mg  BID


 PO


   8/22/20 21:00


    9/18/20 09:40





 


 Atorvastatin


 Calcium


  (Lipitor)  20 mg  DAILY


 PO


   8/23/20 09:00


    9/18/20 09:41





 


 Clozapine


  (Clozaril)  12.5 mg  QHS


 PO


   9/8/20 21:00


 9/9/20 17:10 DC 9/8/20 21:57





 


 Clozapine


  (Clozaril)  25 mg  DAILY


 PO


   9/17/20 09:00


    9/18/20 09:43





 


 Clozapine


  (Clozaril)  25 mg  QHS


 PO


   9/9/20 21:00


 9/14/20 11:26 DC 9/13/20 20:57





 


 Clozapine


  (Clozaril)  50 mg  QHS


 PO


   9/14/20 21:00


    9/17/20 21:53





 


 Diphenhydramine


 HCl


  (Benadryl)  50 mg  TID  PRN


 PO


 ANXIETY/AGITATION  8/22/20 20:00


    9/18/20 03:45





 


 Haloperidol


  (Haldol)  20 mg  QHS


 PO


   8/22/20 21:00


    9/17/20 21:53





 


 Home Med


  (Med Rec


 Complete!)    ASDIRECTED


 XX


   8/22/20 19:00


 8/22/20 18:55 DC  





 


 Ibuprofen


  (Advil)  600 mg  TID  PRN


 PO


 PAIN  8/22/20 20:00


    9/13/20 20:59





 


 Levothyroxine


 Sodium


  (Synthroid)  25 mcg  DAILY@0600


 PO


   8/23/20 06:00


    9/18/20 06:11





 


 Losartan Potassium


  (Cozaar)  25 mg  DAILY


 PO


   8/23/20 09:00


    9/18/20 09:43





 


 Magnesium


 Hydroxide


  (Milk Of


 Magnesia)  30 ml  DAILYPRN  PRN


 PO


 CONSTIPATION  8/22/20 18:30


     





 


 Metformin HCl


  (Glucophage Xr)  500 mg  BID@0800,1800


 PO


   8/22/20 18:00


    9/18/20 09:40





 


 Miscellaneous


  (Unresolved


 Clarification


 Entry)  SEE LABEL


 COMMENTS  DAILY


 XX


   9/11/20 09:00


 9/11/20 14:36 DC  





 


 Modafinil


  (Provigil)  50 mg  DAILY


 PO


   9/17/20 09:00


    9/18/20 09:41





 


 Non-Formulary


 Medication


  (** See Comment


 Field Below **)  SEE


 COMMENTS


 SECTION  1T@10


 XX


   9/3/20 10:00


 9/1/20 12:33 DC  





 


 Non-Formulary


 Medication


  (** See Comment


 Field Below **)  SEE


 COMMENTS


 SECTION  DAILY@1000


 XX


   9/5/20 10:00


 9/6/20 09:59 DC  





 


 Non-Formulary


 Medication


  (** See Comment


 Field Below **)  SEE LABEL


 COMMENTS  DAILY


 XX


   9/1/20 09:00


 9/1/20 13:42 DC  





 


 Non-Formulary


 Medication


  (** See Comment


 Field Below **)  SEE LABEL


 COMMENTS  DAILY


 XX


   9/2/20 09:00


 9/3/20 11:04 DC  





 


 Propranolol HCl


  (Inderal)  10 mg  TID


 PO


   8/22/20 21:00


    9/18/20 09:40





 


 Senna/Docusate


 Sodium


  (Senokot S)  1 tab  DAILY


 PO


   9/15/20 09:00


     





 


 Trazodone HCl


  (Desyrel)  50 mg  QHSP  PRN


 PO


 INSOMNIA  8/22/20 18:30


   Cancel  





 


 Zolpidem Tartrate


  (Ambien)  10 mg  QHSP  PRN


 PO


 Insomnia  8/22/20 20:00


 8/29/20 19:59 DC 8/28/20 20:51





 


 Zolpidem Tartrate


  (Ambien)  10 mg  QHSP  PRN


 PO


 Insomnia   9/4/20 23:00


    9/16/20 21:13














Allergies


Coded Allergies:  


     No Known Allergies (Unverified , 8/22/20)











DEBRA MARQUEZ DO              Sep 18, 2020 10:08

## 2020-09-19 VITALS — SYSTOLIC BLOOD PRESSURE: 140 MMHG | DIASTOLIC BLOOD PRESSURE: 88 MMHG

## 2020-09-19 VITALS — DIASTOLIC BLOOD PRESSURE: 81 MMHG | SYSTOLIC BLOOD PRESSURE: 151 MMHG

## 2020-09-19 RX ADMIN — PROPRANOLOL HYDROCHLORIDE SCH MG: 10 TABLET ORAL at 20:49

## 2020-09-19 RX ADMIN — ATORVASTATIN CALCIUM SCH MG: 20 TABLET, FILM COATED ORAL at 09:06

## 2020-09-19 RX ADMIN — DOCUSATE SODIUM,SENNOSIDES SCH TAB: 50; 8.6 TABLET, FILM COATED ORAL at 09:00

## 2020-09-19 RX ADMIN — CLOZAPINE SCH MG: 25 TABLET ORAL at 20:50

## 2020-09-19 RX ADMIN — CLOZAPINE SCH MG: 25 TABLET ORAL at 09:03

## 2020-09-19 RX ADMIN — LOSARTAN POTASSIUM SCH MG: 25 TABLET, FILM COATED ORAL at 09:05

## 2020-09-19 RX ADMIN — MODAFINIL SCH MG: 100 TABLET ORAL at 09:03

## 2020-09-19 RX ADMIN — LEVOTHYROXINE SODIUM SCH MCG: 25 TABLET ORAL at 06:03

## 2020-09-19 RX ADMIN — PROPRANOLOL HYDROCHLORIDE SCH MG: 10 TABLET ORAL at 15:18

## 2020-09-19 RX ADMIN — Medication PRN EACH: at 09:03

## 2020-09-19 RX ADMIN — METFORMIN HYDROCHLORIDE SCH MG: 500 TABLET, EXTENDED RELEASE ORAL at 09:04

## 2020-09-19 RX ADMIN — PROPRANOLOL HYDROCHLORIDE SCH MG: 10 TABLET ORAL at 09:05

## 2020-09-19 RX ADMIN — ARIPIPRAZOLE SCH MG: 15 TABLET ORAL at 09:06

## 2020-09-19 RX ADMIN — DIPHENHYDRAMINE HYDROCHLORIDE PRN MG: 50 CAPSULE ORAL at 20:49

## 2020-09-19 RX ADMIN — ARIPIPRAZOLE SCH MG: 15 TABLET ORAL at 20:50

## 2020-09-19 RX ADMIN — METFORMIN HYDROCHLORIDE SCH MG: 500 TABLET, EXTENDED RELEASE ORAL at 17:12

## 2020-09-20 VITALS — SYSTOLIC BLOOD PRESSURE: 135 MMHG | DIASTOLIC BLOOD PRESSURE: 81 MMHG

## 2020-09-20 VITALS — SYSTOLIC BLOOD PRESSURE: 140 MMHG | DIASTOLIC BLOOD PRESSURE: 87 MMHG

## 2020-09-20 RX ADMIN — LOSARTAN POTASSIUM SCH MG: 25 TABLET, FILM COATED ORAL at 09:13

## 2020-09-20 RX ADMIN — DIPHENHYDRAMINE HYDROCHLORIDE PRN MG: 50 CAPSULE ORAL at 03:00

## 2020-09-20 RX ADMIN — DIPHENHYDRAMINE HYDROCHLORIDE PRN MG: 50 CAPSULE ORAL at 21:15

## 2020-09-20 RX ADMIN — DOCUSATE SODIUM,SENNOSIDES SCH TAB: 50; 8.6 TABLET, FILM COATED ORAL at 09:00

## 2020-09-20 RX ADMIN — ATORVASTATIN CALCIUM SCH MG: 20 TABLET, FILM COATED ORAL at 09:13

## 2020-09-20 RX ADMIN — DIPHENHYDRAMINE HYDROCHLORIDE PRN MG: 50 CAPSULE ORAL at 13:31

## 2020-09-20 RX ADMIN — CLOZAPINE SCH MG: 25 TABLET ORAL at 21:14

## 2020-09-20 RX ADMIN — ARIPIPRAZOLE SCH MG: 15 TABLET ORAL at 09:13

## 2020-09-20 RX ADMIN — METFORMIN HYDROCHLORIDE SCH MG: 500 TABLET, EXTENDED RELEASE ORAL at 09:12

## 2020-09-20 RX ADMIN — PROPRANOLOL HYDROCHLORIDE SCH MG: 10 TABLET ORAL at 09:13

## 2020-09-20 RX ADMIN — PROPRANOLOL HYDROCHLORIDE SCH MG: 10 TABLET ORAL at 21:15

## 2020-09-20 RX ADMIN — PROPRANOLOL HYDROCHLORIDE SCH MG: 10 TABLET ORAL at 15:42

## 2020-09-20 RX ADMIN — LEVOTHYROXINE SODIUM SCH MCG: 25 TABLET ORAL at 06:06

## 2020-09-20 RX ADMIN — METFORMIN HYDROCHLORIDE SCH MG: 500 TABLET, EXTENDED RELEASE ORAL at 17:09

## 2020-09-20 RX ADMIN — Medication SCH EA: at 09:00

## 2020-09-20 RX ADMIN — MODAFINIL SCH MG: 100 TABLET ORAL at 09:10

## 2020-09-20 RX ADMIN — ARIPIPRAZOLE SCH MG: 15 TABLET ORAL at 21:14

## 2020-09-20 RX ADMIN — CLOZAPINE SCH MG: 25 TABLET ORAL at 09:13

## 2020-09-21 VITALS — SYSTOLIC BLOOD PRESSURE: 141 MMHG | DIASTOLIC BLOOD PRESSURE: 68 MMHG

## 2020-09-21 VITALS — SYSTOLIC BLOOD PRESSURE: 140 MMHG | DIASTOLIC BLOOD PRESSURE: 94 MMHG

## 2020-09-21 RX ADMIN — DOCUSATE SODIUM,SENNOSIDES SCH TAB: 50; 8.6 TABLET, FILM COATED ORAL at 08:59

## 2020-09-21 RX ADMIN — CLOZAPINE SCH MG: 25 TABLET ORAL at 21:35

## 2020-09-21 RX ADMIN — ARIPIPRAZOLE SCH MG: 15 TABLET ORAL at 08:56

## 2020-09-21 RX ADMIN — MODAFINIL SCH MG: 100 TABLET ORAL at 08:58

## 2020-09-21 RX ADMIN — CLOZAPINE SCH MG: 25 TABLET ORAL at 08:56

## 2020-09-21 RX ADMIN — PROPRANOLOL HYDROCHLORIDE SCH MG: 10 TABLET ORAL at 21:39

## 2020-09-21 RX ADMIN — LEVOTHYROXINE SODIUM SCH MCG: 25 TABLET ORAL at 05:37

## 2020-09-21 RX ADMIN — METFORMIN HYDROCHLORIDE SCH MG: 500 TABLET, EXTENDED RELEASE ORAL at 17:05

## 2020-09-21 RX ADMIN — ARIPIPRAZOLE SCH MG: 15 TABLET ORAL at 21:35

## 2020-09-21 RX ADMIN — DIPHENHYDRAMINE HYDROCHLORIDE PRN MG: 50 CAPSULE ORAL at 21:35

## 2020-09-21 RX ADMIN — METFORMIN HYDROCHLORIDE SCH MG: 500 TABLET, EXTENDED RELEASE ORAL at 08:55

## 2020-09-21 RX ADMIN — Medication SCH EA: at 09:00

## 2020-09-21 RX ADMIN — PROPRANOLOL HYDROCHLORIDE SCH MG: 10 TABLET ORAL at 08:57

## 2020-09-21 RX ADMIN — PROPRANOLOL HYDROCHLORIDE SCH MG: 10 TABLET ORAL at 15:46

## 2020-09-21 RX ADMIN — ATORVASTATIN CALCIUM SCH MG: 20 TABLET, FILM COATED ORAL at 08:57

## 2020-09-21 RX ADMIN — LOSARTAN POTASSIUM SCH MG: 25 TABLET, FILM COATED ORAL at 08:56

## 2020-09-22 VITALS — SYSTOLIC BLOOD PRESSURE: 131 MMHG | DIASTOLIC BLOOD PRESSURE: 74 MMHG

## 2020-09-22 VITALS — SYSTOLIC BLOOD PRESSURE: 146 MMHG | DIASTOLIC BLOOD PRESSURE: 76 MMHG

## 2020-09-22 RX ADMIN — CLOZAPINE SCH MG: 25 TABLET ORAL at 21:30

## 2020-09-22 RX ADMIN — ARIPIPRAZOLE SCH MG: 15 TABLET ORAL at 21:30

## 2020-09-22 RX ADMIN — LEVOTHYROXINE SODIUM SCH MCG: 25 TABLET ORAL at 05:06

## 2020-09-22 RX ADMIN — Medication PRN EACH: at 08:29

## 2020-09-22 RX ADMIN — MODAFINIL SCH MG: 100 TABLET ORAL at 08:32

## 2020-09-22 RX ADMIN — PROPRANOLOL HYDROCHLORIDE SCH MG: 10 TABLET ORAL at 08:32

## 2020-09-22 RX ADMIN — METFORMIN HYDROCHLORIDE SCH MG: 500 TABLET, EXTENDED RELEASE ORAL at 17:33

## 2020-09-22 RX ADMIN — ATORVASTATIN CALCIUM SCH MG: 20 TABLET, FILM COATED ORAL at 08:32

## 2020-09-22 RX ADMIN — ARIPIPRAZOLE SCH MG: 15 TABLET ORAL at 08:31

## 2020-09-22 RX ADMIN — LOSARTAN POTASSIUM SCH MG: 25 TABLET, FILM COATED ORAL at 08:31

## 2020-09-22 RX ADMIN — PROPRANOLOL HYDROCHLORIDE SCH MG: 10 TABLET ORAL at 21:29

## 2020-09-22 RX ADMIN — PROPRANOLOL HYDROCHLORIDE SCH MG: 10 TABLET ORAL at 16:19

## 2020-09-22 RX ADMIN — DOCUSATE SODIUM,SENNOSIDES SCH TAB: 50; 8.6 TABLET, FILM COATED ORAL at 08:35

## 2020-09-22 RX ADMIN — CLOZAPINE SCH MG: 25 TABLET ORAL at 08:31

## 2020-09-22 RX ADMIN — METFORMIN HYDROCHLORIDE SCH MG: 500 TABLET, EXTENDED RELEASE ORAL at 08:30

## 2020-09-22 NOTE — MHIPNPDOC
[facility] Progress Note


Progress Note


DATE OF SERVICE: 9/22/20


Subjective


HPI:





Patient was met with in the office for a follow up. He reports that he couldnt 

sleep last night, but is happy that he has a chair. He reports that he sleeps 

much better in chairs. Significant thought blocking is present. He denies any 

constipation, appears quite concrete. He notes that he is tied, but was much 

more amenable today to meeting. Usually, he will not get up to meet with me. He 

reports the voices are nearly gone since starting the Clozaril and does not 

report any other problems.





Objective


Appearance: Well groomed. Well nourished. Appears to be stated age.











Affect: Doesnt appear to be responding to internal stimuli. Flat.











Cognition: Somewhat slowed. Alert, Attentive, and Oriented to person, place, 

time.











Thought Form: Siginificant thought blocking present with some negative symptoms.











Judgement: Mildly improved.











Insight: Mildly improved.





Assessment


F20.9 Schizophrenia, unspecified





Plan


Continue Clozapine at current dose, no signs of adverse effects. Will continue 

Prophylaxis.





CBC and EKG to be redrawn shortly.





Referral in process for [facility] psychiatric as he is an [facility] 

resident.





He will likely need long-term treatment as he appears still quite impaired with 

a significant amount of thought blocking.





Vital Signs





Vital Signs








  Date Time  Temp Pulse Resp B/P (MAP) Pulse Ox O2 Delivery O2 Flow Rate FiO2


 


9/22/20 08:32  69  146/76    


 


9/22/20 06:23 98.2  18  96 Room Air  











Laboratory Data


24H Labs


Laboratory Tests 2


9/22/20 05:34: Bedside Glucose (Misc Panel) 120H





Current Medications





Current Medications








 Medications


  (Trade)  Dose


 Ordered  Sig/Heather


 Route


 PRN Reason  Start Time


 Stop Time Status Last Admin


Dose Admin


 


 Acetaminophen


  (Tylenol Tab)  650 mg  Q6HP  PRN


 PO


 HEADACHE or DISCOMFORT  8/22/20 18:30


   Cancel  





 


 Al Hydrox/Mg


 Hydrox/Simethicone


  (Mylanta)  30 ml  Q4HP  PRN


 PO


 HEARTBURN/INDIGESTION  8/22/20 18:30


     





 


 Aripiprazole


  (AbiLIFY)  15 mg  BID


 PO


   8/22/20 21:00


    9/22/20 08:31





 


 Atorvastatin


 Calcium


  (Lipitor)  20 mg  DAILY


 PO


   8/23/20 09:00


    9/22/20 08:32





 


 Clozapine


  (Clozaril)  12.5 mg  QHS


 PO


   9/8/20 21:00


 9/9/20 17:10 DC 9/8/20 21:57





 


 Clozapine


  (Clozaril)  25 mg  DAILY


 PO


   9/17/20 09:00


    9/22/20 08:31





 


 Clozapine


  (Clozaril)  25 mg  QHS


 PO


   9/9/20 21:00


 9/14/20 11:26 DC 9/13/20 20:57





 


 Clozapine


  (Clozaril)  50 mg  QHS


 PO


   9/14/20 21:00


    9/21/20 21:35





 


 Diphenhydramine


 HCl


  (Benadryl)  50 mg  TID  PRN


 PO


 ANXIETY/AGITATION  8/22/20 20:00


    9/21/20 21:35





 


 Haloperidol


  (Haldol)  20 mg  QHS


 PO


   8/22/20 21:00


    9/21/20 21:34





 


 Home Med


  (Med Rec


 Complete!)    ASDIRECTED


 XX


   8/22/20 19:00


 8/22/20 18:55 DC  





 


 Ibuprofen


  (Advil)  600 mg  TID  PRN


 PO


 PAIN  8/22/20 20:00


    9/13/20 20:59





 


 Levothyroxine


 Sodium


  (Synthroid)  25 mcg  DAILY@0600


 PO


   8/23/20 06:00


    9/22/20 05:06





 


 Losartan Potassium


  (Cozaar)  25 mg  DAILY


 PO


   8/23/20 09:00


    9/22/20 08:31





 


 Magnesium


 Hydroxide


  (Milk Of


 Magnesia)  30 ml  DAILYPRN  PRN


 PO


 CONSTIPATION  8/22/20 18:30


     





 


 Metformin HCl


  (Glucophage Xr)  500 mg  BID@0800,1800


 PO


   8/22/20 18:00


    9/22/20 08:30





 


 Miscellaneous


  (Unresolved


 Clarification


 Entry)  SEE LABEL


 COMMENTS  DAILY


 XX


   9/11/20 09:00


 9/11/20 14:36 DC  





 


 Miscellaneous


  (Unresolved


 Clarification


 Entry)  SEE LABEL


 COMMENTS  DAILY


 XX


   9/20/20 09:00


 9/21/20 15:47 DC  





 


 Modafinil


  (Provigil)  50 mg  DAILY


 PO


   9/17/20 09:00


    9/22/20 08:32





 


 Non-Formulary


 Medication


  (** See Comment


 Field Below **)  SEE


 COMMENTS


 SECTION  1T@10


 XX


   9/3/20 10:00


 9/1/20 12:33 DC  





 


 Non-Formulary


 Medication


  (** See Comment


 Field Below **)  SEE


 COMMENTS


 SECTION  DAILY@1000


 XX


   9/5/20 10:00


 9/6/20 09:59 DC  





 


 Non-Formulary


 Medication


  (** See Comment


 Field Below **)  SEE LABEL


 COMMENTS  DAILY


 XX


   9/1/20 09:00


 9/1/20 13:42 DC  





 


 Non-Formulary


 Medication


  (** See Comment


 Field Below **)  SEE LABEL


 COMMENTS  DAILY


 XX


   9/2/20 09:00


 9/3/20 11:04 DC  





 


 Propranolol HCl


  (Inderal)  10 mg  TID


 PO


   8/22/20 21:00


    9/22/20 08:32





 


 Senna/Docusate


 Sodium


  (Senokot S)  1 tab  DAILY


 PO


   9/15/20 09:00


     





 


 Trazodone HCl


  (Desyrel)  50 mg  QHSP  PRN


 PO


 INSOMNIA  8/22/20 18:30


   Cancel  





 


 Zolpidem Tartrate


  (Ambien)  10 mg  QHSP  PRN


 PO


 Insomnia  8/22/20 20:00


 8/29/20 19:59 DC 8/28/20 20:51





 


 Zolpidem Tartrate


  (Ambien)  10 mg  QHSP  PRN


 PO


 INSOMNIA  9/18/20 21:15


    9/21/20 21:35





 


 Zolpidem Tartrate


  (Ambien)  10 mg  QHSP  PRN


 PO


 Insomnia   9/4/20 23:00


 9/18/20 13:49 DC 9/16/20 21:13














Allergies


Coded Allergies:  


     No Known Allergies (Unverified , 8/22/20)











DEBRA MARQUEZ DO              Sep 22, 2020 09:24

## 2020-09-23 VITALS — DIASTOLIC BLOOD PRESSURE: 94 MMHG | SYSTOLIC BLOOD PRESSURE: 137 MMHG

## 2020-09-23 VITALS — DIASTOLIC BLOOD PRESSURE: 88 MMHG | SYSTOLIC BLOOD PRESSURE: 156 MMHG

## 2020-09-23 RX ADMIN — PROPRANOLOL HYDROCHLORIDE SCH MG: 10 TABLET ORAL at 08:20

## 2020-09-23 RX ADMIN — PROPRANOLOL HYDROCHLORIDE SCH MG: 10 TABLET ORAL at 21:13

## 2020-09-23 RX ADMIN — DOCUSATE SODIUM,SENNOSIDES SCH TAB: 50; 8.6 TABLET, FILM COATED ORAL at 08:20

## 2020-09-23 RX ADMIN — METFORMIN HYDROCHLORIDE SCH MG: 500 TABLET, EXTENDED RELEASE ORAL at 08:20

## 2020-09-23 RX ADMIN — CLOZAPINE SCH MG: 25 TABLET ORAL at 08:19

## 2020-09-23 RX ADMIN — MODAFINIL SCH MG: 100 TABLET ORAL at 08:20

## 2020-09-23 RX ADMIN — ATORVASTATIN CALCIUM SCH MG: 20 TABLET, FILM COATED ORAL at 08:20

## 2020-09-23 RX ADMIN — PROPRANOLOL HYDROCHLORIDE SCH MG: 10 TABLET ORAL at 16:14

## 2020-09-23 RX ADMIN — LOSARTAN POTASSIUM SCH MG: 25 TABLET, FILM COATED ORAL at 08:21

## 2020-09-23 RX ADMIN — METFORMIN HYDROCHLORIDE SCH MG: 500 TABLET, EXTENDED RELEASE ORAL at 17:20

## 2020-09-23 RX ADMIN — ARIPIPRAZOLE SCH MG: 15 TABLET ORAL at 21:12

## 2020-09-23 RX ADMIN — LEVOTHYROXINE SODIUM SCH MCG: 25 TABLET ORAL at 06:08

## 2020-09-23 RX ADMIN — ARIPIPRAZOLE SCH MG: 15 TABLET ORAL at 08:20

## 2020-09-23 RX ADMIN — CLOZAPINE SCH MG: 25 TABLET ORAL at 21:12

## 2020-09-23 NOTE — MHIPNPDOC
[facility] Progress Note


Progress Note


DATE OF SERVICE: 9/23/20


Subjective


HPI:





Lowell presents today for a follow-up. Patient states his legs feel heavy. He 

states his suicidal thoughts and hearing voices in his head are getting 

better.Leaves the interview early as he doesn't want to go to \[facility]\""





Objective


Thought Form: Distorted. Psychotic. si/hi as above


fair hygiene


thought blocking present


slowed cognition


i/j: poor


speech: slow


affect: flat, little reactivity





Assessment


F20.9 Schizophrenia, unspecified





Plan


Continue current medications and Modafinil.





Referral in place.





Vital Signs





Vital Signs








  Date Time  Temp Pulse Resp B/P (MAP) Pulse Ox O2 Delivery O2 Flow Rate FiO2


 


9/23/20 08:21    137/94    


 


9/23/20 08:20  81      


 


9/23/20 06:48 98.3  18  93 Room Air  











Laboratory Data


24H Labs


Laboratory Tests 2


9/23/20 06:06: Bedside Glucose (Misc Panel) 123H





Current Medications





Current Medications








 Medications


  (Trade)  Dose


 Ordered  Sig/Heather


 Route


 PRN Reason  Start Time


 Stop Time Status Last Admin


Dose Admin


 


 Acetaminophen


  (Tylenol Tab)  650 mg  Q6HP  PRN


 PO


 HEADACHE or DISCOMFORT  8/22/20 18:30


   Cancel  





 


 Al Hydrox/Mg


 Hydrox/Simethicone


  (Mylanta)  30 ml  Q4HP  PRN


 PO


 HEARTBURN/INDIGESTION  8/22/20 18:30


     





 


 Aripiprazole


  (AbiLIFY)  15 mg  BID


 PO


   8/22/20 21:00


    9/23/20 08:20





 


 Atorvastatin


 Calcium


  (Lipitor)  20 mg  DAILY


 PO


   8/23/20 09:00


    9/23/20 08:20





 


 Clozapine


  (Clozaril)  12.5 mg  QHS


 PO


   9/8/20 21:00


 9/9/20 17:10 DC 9/8/20 21:57





 


 Clozapine


  (Clozaril)  25 mg  DAILY


 PO


   9/17/20 09:00


    9/23/20 08:19





 


 Clozapine


  (Clozaril)  25 mg  QHS


 PO


   9/9/20 21:00


 9/14/20 11:26 DC 9/13/20 20:57





 


 Clozapine


  (Clozaril)  50 mg  QHS


 PO


   9/14/20 21:00


    9/22/20 21:30





 


 Diphenhydramine


 HCl


  (Benadryl)  50 mg  TID  PRN


 PO


 ANXIETY/AGITATION  8/22/20 20:00


    9/21/20 21:35





 


 Haloperidol


  (Haldol)  20 mg  QHS


 PO


   8/22/20 21:00


    9/22/20 21:29





 


 Home Med


  (Med Rec


 Complete!)    ASDIRECTED


 XX


   8/22/20 19:00


 8/22/20 18:55 DC  





 


 Ibuprofen


  (Advil)  600 mg  TID  PRN


 PO


 PAIN  8/22/20 20:00


    9/13/20 20:59





 


 Levothyroxine


 Sodium


  (Synthroid)  25 mcg  DAILY@0600


 PO


   8/23/20 06:00


    9/23/20 06:08





 


 Losartan Potassium


  (Cozaar)  25 mg  DAILY


 PO


   8/23/20 09:00


    9/23/20 08:21





 


 Magnesium


 Hydroxide


  (Milk Of


 Magnesia)  30 ml  DAILYPRN  PRN


 PO


 CONSTIPATION  8/22/20 18:30


     





 


 Metformin HCl


  (Glucophage Xr)  500 mg  BID@0800,1800


 PO


   8/22/20 18:00


    9/23/20 08:20





 


 Miscellaneous


  (Unresolved


 Clarification


 Entry)  SEE LABEL


 COMMENTS  DAILY


 XX


   9/11/20 09:00


 9/11/20 14:36 DC  





 


 Miscellaneous


  (Unresolved


 Clarification


 Entry)  SEE LABEL


 COMMENTS  DAILY


 XX


   9/20/20 09:00


 9/21/20 15:47 DC  





 


 Modafinil


  (Provigil)  50 mg  DAILY


 PO


   9/17/20 09:00


 9/22/20 11:05 DC 9/22/20 08:32





 


 Modafinil


  (Provigil)  100 mg  DAILY


 PO


   9/23/20 09:00


    9/23/20 08:20





 


 Non-Formulary


 Medication


  (** See Comment


 Field Below **)  SEE


 COMMENTS


 SECTION  1T@10


 XX


   9/3/20 10:00


 9/1/20 12:33 DC  





 


 Non-Formulary


 Medication


  (** See Comment


 Field Below **)  SEE


 COMMENTS


 SECTION  DAILY@1000


 XX


   9/5/20 10:00


 9/6/20 09:59 DC  





 


 Non-Formulary


 Medication


  (** See Comment


 Field Below **)  SEE LABEL


 COMMENTS  DAILY


 XX


   9/1/20 09:00


 9/1/20 13:42 DC  





 


 Non-Formulary


 Medication


  (** See Comment


 Field Below **)  SEE LABEL


 COMMENTS  DAILY


 XX


   9/2/20 09:00


 9/3/20 11:04 DC  





 


 Propranolol HCl


  (Inderal)  10 mg  TID


 PO


   8/22/20 21:00


    9/23/20 08:20





 


 Senna/Docusate


 Sodium


  (Senokot S)  1 tab  DAILY


 PO


   9/15/20 09:00


    9/23/20 08:20





 


 Trazodone HCl


  (Desyrel)  50 mg  QHSP  PRN


 PO


 INSOMNIA  8/22/20 18:30


   Cancel  





 


 Zolpidem Tartrate


  (Ambien)  10 mg  QHSP  PRN


 PO


 Insomnia  8/22/20 20:00


 8/29/20 19:59 DC 8/28/20 20:51





 


 Zolpidem Tartrate


  (Ambien)  10 mg  QHSP  PRN


 PO


 INSOMNIA  9/18/20 21:15


    9/22/20 21:29





 


 Zolpidem Tartrate


  (Ambien)  10 mg  QHSP  PRN


 PO


 Insomnia   9/4/20 23:00


 9/18/20 13:49 DC 9/16/20 21:13














Allergies


Coded Allergies:  


     No Known Allergies (Unverified , 8/22/20)











DEBRA MARQUEZ DO              Sep 23, 2020 09:56

## 2020-09-24 VITALS — DIASTOLIC BLOOD PRESSURE: 69 MMHG | SYSTOLIC BLOOD PRESSURE: 137 MMHG

## 2020-09-24 VITALS — SYSTOLIC BLOOD PRESSURE: 147 MMHG | DIASTOLIC BLOOD PRESSURE: 89 MMHG

## 2020-09-24 RX ADMIN — PROPRANOLOL HYDROCHLORIDE SCH MG: 10 TABLET ORAL at 08:17

## 2020-09-24 RX ADMIN — ATORVASTATIN CALCIUM SCH MG: 20 TABLET, FILM COATED ORAL at 08:17

## 2020-09-24 RX ADMIN — MODAFINIL SCH MG: 100 TABLET ORAL at 08:16

## 2020-09-24 RX ADMIN — CLOZAPINE SCH MG: 25 TABLET ORAL at 20:58

## 2020-09-24 RX ADMIN — CLOZAPINE SCH MG: 25 TABLET ORAL at 08:16

## 2020-09-24 RX ADMIN — LOSARTAN POTASSIUM SCH MG: 25 TABLET, FILM COATED ORAL at 08:16

## 2020-09-24 RX ADMIN — PROPRANOLOL HYDROCHLORIDE SCH MG: 10 TABLET ORAL at 21:00

## 2020-09-24 RX ADMIN — ARIPIPRAZOLE SCH MG: 15 TABLET ORAL at 08:16

## 2020-09-24 RX ADMIN — DOCUSATE SODIUM,SENNOSIDES SCH TAB: 50; 8.6 TABLET, FILM COATED ORAL at 08:19

## 2020-09-24 RX ADMIN — PROPRANOLOL HYDROCHLORIDE SCH MG: 10 TABLET ORAL at 15:32

## 2020-09-24 RX ADMIN — LEVOTHYROXINE SODIUM SCH MCG: 25 TABLET ORAL at 06:10

## 2020-09-24 RX ADMIN — ARIPIPRAZOLE SCH MG: 15 TABLET ORAL at 20:58

## 2020-09-24 RX ADMIN — METFORMIN HYDROCHLORIDE SCH MG: 500 TABLET, EXTENDED RELEASE ORAL at 08:17

## 2020-09-24 RX ADMIN — METFORMIN HYDROCHLORIDE SCH MG: 500 TABLET, EXTENDED RELEASE ORAL at 18:10

## 2020-09-24 NOTE — MHIPNPDOC
[facility] Progress Note


Progress Note


DATE OF SERVICE: 9/24/20





Subjective


HPI:





The patient is attempted be met with today, however he declined, stating that he

would meet "after lunch", but then does acknowledge this provider afterwards.  

He is been notably upset about being referred for long-term care touching 

psychiatric, although he has had difficulties care for himself, needing to be 

cleaned up after having involuntary bowel movements.  He has not been a danger 

to himself.





Objective


General: fair hygiene


Speech: fluid


Thought processes:, less tangential


Thought content: focused on lunch


Abstract reasoning, and computation: baseline impaired


Description of associations: baseline impaired


Description of abnormal or psychotic thoughts: doesn't answer


Judgment: baseline


Insight: baseline


Orientation: [Alert and orientated 3]


Recent and remote memory: [Intact]


Attention span and concentration: [Intact]


Fund of knowledge: [Adequate]


Mood: ["okay"]


Affect: flat with some thought blocking























Assessment


schizophrenia





Plan





Will continue clozapine, as well as other medications, will continue to pursue 

long-term treatment





Vital Signs





Vital Signs








  Date Time  Temp Pulse Resp B/P (MAP) Pulse Ox O2 Delivery O2 Flow Rate FiO2


 


9/24/20 08:17  80  147/89    


 


9/24/20 06:27 97.6  14   Room Air  


 


9/23/20 06:48     93   











Laboratory Data


24H Labs


Laboratory Tests 2


9/24/20 06:00: Bedside Glucose (Misc Panel) 119H





Current Medications





Current Medications








 Medications


  (Trade)  Dose


 Ordered  Sig/Heather


 Route


 PRN Reason  Start Time


 Stop Time Status Last Admin


Dose Admin


 


 Acetaminophen


  (Tylenol Tab)  650 mg  Q6HP  PRN


 PO


 HEADACHE or DISCOMFORT  8/22/20 18:30


   Cancel  





 


 Al Hydrox/Mg


 Hydrox/Simethicone


  (Mylanta)  30 ml  Q4HP  PRN


 PO


 HEARTBURN/INDIGESTION  8/22/20 18:30


     





 


 Aripiprazole


  (AbiLIFY)  15 mg  BID


 PO


   8/22/20 21:00


    9/24/20 08:16





 


 Atorvastatin


 Calcium


  (Lipitor)  20 mg  DAILY


 PO


   8/23/20 09:00


    9/24/20 08:17





 


 Clozapine


  (Clozaril)  12.5 mg  QHS


 PO


   9/8/20 21:00


 9/9/20 17:10 DC 9/8/20 21:57





 


 Clozapine


  (Clozaril)  25 mg  DAILY


 PO


   9/17/20 09:00


    9/24/20 08:16





 


 Clozapine


  (Clozaril)  25 mg  QHS


 PO


   9/9/20 21:00


 9/14/20 11:26 DC 9/13/20 20:57





 


 Clozapine


  (Clozaril)  50 mg  QHS


 PO


   9/14/20 21:00


    9/23/20 21:12





 


 Diphenhydramine


 HCl


  (Benadryl)  50 mg  TID  PRN


 PO


 ANXIETY/AGITATION  8/22/20 20:00


    9/21/20 21:35





 


 Haloperidol


  (Haldol)  20 mg  QHS


 PO


   8/22/20 21:00


    9/23/20 21:12





 


 Home Med


  (Med Rec


 Complete!)    ASDIRECTED


 XX


   8/22/20 19:00


 8/22/20 18:55 DC  





 


 Ibuprofen


  (Advil)  600 mg  TID  PRN


 PO


 PAIN  8/22/20 20:00


    9/13/20 20:59





 


 Levothyroxine


 Sodium


  (Synthroid)  25 mcg  DAILY@0600


 PO


   8/23/20 06:00


    9/24/20 06:10





 


 Losartan Potassium


  (Cozaar)  25 mg  DAILY


 PO


   8/23/20 09:00


    9/24/20 08:16





 


 Magnesium


 Hydroxide


  (Milk Of


 Magnesia)  30 ml  DAILYPRN  PRN


 PO


 CONSTIPATION  8/22/20 18:30


     





 


 Metformin HCl


  (Glucophage Xr)  500 mg  BID@0800,1800


 PO


   8/22/20 18:00


    9/24/20 08:17





 


 Miscellaneous


  (Unresolved


 Clarification


 Entry)  SEE LABEL


 COMMENTS  DAILY


 XX


   9/11/20 09:00


 9/11/20 14:36 DC  





 


 Miscellaneous


  (Unresolved


 Clarification


 Entry)  SEE LABEL


 COMMENTS  DAILY


 XX


   9/20/20 09:00


 9/21/20 15:47 DC  





 


 Modafinil


  (Provigil)  50 mg  DAILY


 PO


   9/17/20 09:00


 9/22/20 11:05 DC 9/22/20 08:32





 


 Modafinil


  (Provigil)  100 mg  DAILY


 PO


   9/23/20 09:00


    9/24/20 08:16





 


 Non-Formulary


 Medication


  (** See Comment


 Field Below **)  SEE


 COMMENTS


 SECTION  1T@10


 XX


   9/3/20 10:00


 9/1/20 12:33 DC  





 


 Non-Formulary


 Medication


  (** See Comment


 Field Below **)  SEE


 COMMENTS


 SECTION  DAILY@1000


 XX


   9/5/20 10:00


 9/6/20 09:59 DC  





 


 Non-Formulary


 Medication


  (** See Comment


 Field Below **)  SEE LABEL


 COMMENTS  DAILY


 XX


   9/1/20 09:00


 9/1/20 13:42 DC  





 


 Non-Formulary


 Medication


  (** See Comment


 Field Below **)  SEE LABEL


 COMMENTS  DAILY


 XX


   9/2/20 09:00


 9/3/20 11:04 DC  





 


 Propranolol HCl


  (Inderal)  10 mg  TID


 PO


   8/22/20 21:00


    9/24/20 08:17





 


 Senna/Docusate


 Sodium


  (Senokot S)  1 tab  DAILY


 PO


   9/15/20 09:00


    9/23/20 08:20





 


 Trazodone HCl


  (Desyrel)  50 mg  QHSP  PRN


 PO


 INSOMNIA  8/22/20 18:30


   Cancel  





 


 Zolpidem Tartrate


  (Ambien)  10 mg  QHSP  PRN


 PO


 Insomnia  8/22/20 20:00


 8/29/20 19:59 DC 8/28/20 20:51





 


 Zolpidem Tartrate


  (Ambien)  10 mg  QHSP  PRN


 PO


 INSOMNIA  9/18/20 21:15


    9/23/20 21:15





 


 Zolpidem Tartrate


  (Ambien)  10 mg  QHSP  PRN


 PO


 Insomnia   9/4/20 23:00


 9/18/20 13:49 DC 9/16/20 21:13














Allergies


Coded Allergies:  


     No Known Allergies (Unverified , 8/22/20)











DEBRA MARQUEZ DO              Sep 24, 2020 10:27

## 2020-09-25 VITALS — DIASTOLIC BLOOD PRESSURE: 87 MMHG | SYSTOLIC BLOOD PRESSURE: 143 MMHG

## 2020-09-25 VITALS — SYSTOLIC BLOOD PRESSURE: 148 MMHG | DIASTOLIC BLOOD PRESSURE: 89 MMHG

## 2020-09-25 RX ADMIN — PROPRANOLOL HYDROCHLORIDE SCH MG: 10 TABLET ORAL at 21:05

## 2020-09-25 RX ADMIN — METFORMIN HYDROCHLORIDE SCH MG: 500 TABLET, EXTENDED RELEASE ORAL at 08:23

## 2020-09-25 RX ADMIN — PROPRANOLOL HYDROCHLORIDE SCH MG: 10 TABLET ORAL at 15:21

## 2020-09-25 RX ADMIN — CLOZAPINE SCH MG: 25 TABLET ORAL at 08:22

## 2020-09-25 RX ADMIN — LOSARTAN POTASSIUM SCH MG: 25 TABLET, FILM COATED ORAL at 08:22

## 2020-09-25 RX ADMIN — ARIPIPRAZOLE SCH MG: 15 TABLET ORAL at 21:03

## 2020-09-25 RX ADMIN — CLOZAPINE SCH MG: 25 TABLET ORAL at 21:03

## 2020-09-25 RX ADMIN — PROPRANOLOL HYDROCHLORIDE SCH MG: 10 TABLET ORAL at 08:23

## 2020-09-25 RX ADMIN — ARIPIPRAZOLE SCH MG: 15 TABLET ORAL at 08:22

## 2020-09-25 RX ADMIN — DOCUSATE SODIUM,SENNOSIDES SCH TAB: 50; 8.6 TABLET, FILM COATED ORAL at 08:53

## 2020-09-25 RX ADMIN — LEVOTHYROXINE SODIUM SCH MCG: 25 TABLET ORAL at 05:56

## 2020-09-25 RX ADMIN — MODAFINIL SCH MG: 100 TABLET ORAL at 08:22

## 2020-09-25 RX ADMIN — METFORMIN HYDROCHLORIDE SCH MG: 500 TABLET, EXTENDED RELEASE ORAL at 17:27

## 2020-09-25 RX ADMIN — ATORVASTATIN CALCIUM SCH MG: 20 TABLET, FILM COATED ORAL at 08:23

## 2020-09-25 NOTE — MHIPNPDOC
[facility] Progress Note


Progress Note


DATE OF SERVICE: 9/25/20


Subjective


HPI:





The patient is attempted to be met with again today, he continues to state that 

he would meet "after lunch", he then doesn't acknowledge this provider, still 

quite angry about going to [facility] or even being referred for treatment there.

 Staff report that he has made some improvement since his admission and is 

notably less preoccupied and doesn't appear to be talking dosing others as 

often.





Objective


General: fair hygiene


Speech: fluid


Thought processes: linear


Thought content: focused on food


Abstract reasoning, and computation: baseline


Description of associations:, baseline


Description of abnormal or psychotic thoughts: makes no threats towards himself 

or others


Judgment:, baseline, poor


Insight: baseline poor


Orientation: [Alert and orientated 3]


Recent and remote memory: [Intact]


Attention span and concentration: [Intact]


Fund of knowledge: [Adequate]


Mood: I want to eat


Affect: flat less thought blocking

















Assessment


schizophrenia





Plan





Plans to continue clozapine her current dose, as well as other medications 

making some progress, continue bowel prophylaxis, will draw the CBC and EKG 

referral in process for long-term care, will have  meet with this 

provider and patient as well as discharge planner next week





Vital Signs





Vital Signs








  Date Time  Temp Pulse Resp B/P (MAP) Pulse Ox O2 Delivery O2 Flow Rate FiO2


 


9/25/20 08:23  72  148/89    


 


9/25/20 06:30 98.3  14   Room Air  


 


9/23/20 06:48     93   











Laboratory Data


24H Labs


Laboratory Tests 2


9/25/20 06:02: Bedside Glucose (Misc Panel) 126H





Current Medications





Current Medications








 Medications


  (Trade)  Dose


 Ordered  Sig/Heather


 Route


 PRN Reason  Start Time


 Stop Time Status Last Admin


Dose Admin


 


 Acetaminophen


  (Tylenol Tab)  650 mg  Q6HP  PRN


 PO


 HEADACHE or DISCOMFORT  8/22/20 18:30


   Cancel  





 


 Al Hydrox/Mg


 Hydrox/Simethicone


  (Mylanta)  30 ml  Q4HP  PRN


 PO


 HEARTBURN/INDIGESTION  8/22/20 18:30


     





 


 Aripiprazole


  (AbiLIFY)  15 mg  BID


 PO


   8/22/20 21:00


    9/25/20 08:22





 


 Atorvastatin


 Calcium


  (Lipitor)  20 mg  DAILY


 PO


   8/23/20 09:00


    9/25/20 08:23





 


 Clozapine


  (Clozaril)  12.5 mg  QHS


 PO


   9/8/20 21:00


 9/9/20 17:10 DC 9/8/20 21:57





 


 Clozapine


  (Clozaril)  25 mg  DAILY


 PO


   9/17/20 09:00


    9/25/20 08:22





 


 Clozapine


  (Clozaril)  25 mg  QHS


 PO


   9/9/20 21:00


 9/14/20 11:26 DC 9/13/20 20:57





 


 Clozapine


  (Clozaril)  50 mg  QHS


 PO


   9/14/20 21:00


    9/24/20 20:58





 


 Diphenhydramine


 HCl


  (Benadryl)  50 mg  TID  PRN


 PO


 ANXIETY/AGITATION  8/22/20 20:00


    9/21/20 21:35





 


 Haloperidol


  (Haldol)  20 mg  QHS


 PO


   8/22/20 21:00


    9/24/20 20:58





 


 Home Med


  (Med Rec


 Complete!)    ASDIRECTED


 XX


   8/22/20 19:00


 8/22/20 18:55 DC  





 


 Ibuprofen


  (Advil)  600 mg  TID  PRN


 PO


 PAIN  8/22/20 20:00


    9/13/20 20:59





 


 Levothyroxine


 Sodium


  (Synthroid)  25 mcg  DAILY@0600


 PO


   8/23/20 06:00


    9/25/20 05:56





 


 Losartan Potassium


  (Cozaar)  25 mg  DAILY


 PO


   8/23/20 09:00


    9/25/20 08:22





 


 Magnesium


 Hydroxide


  (Milk Of


 Magnesia)  30 ml  DAILYPRN  PRN


 PO


 CONSTIPATION  8/22/20 18:30


     





 


 Metformin HCl


  (Glucophage Xr)  500 mg  BID@0800,1800


 PO


   8/22/20 18:00


    9/25/20 08:23





 


 Miscellaneous


  (Unresolved


 Clarification


 Entry)  SEE LABEL


 COMMENTS  DAILY


 XX


   9/11/20 09:00


 9/11/20 14:36 DC  





 


 Miscellaneous


  (Unresolved


 Clarification


 Entry)  SEE LABEL


 COMMENTS  DAILY


 XX


   9/20/20 09:00


 9/21/20 15:47 DC  





 


 Miscellaneous


  (Unresolved


 Clarification


 Entry)  SEE LABEL


 COMMENTS  DAILY


 XX


   9/25/20 09:00


     





 


 Modafinil


  (Provigil)  50 mg  DAILY


 PO


   9/17/20 09:00


 9/22/20 11:05 DC 9/22/20 08:32





 


 Modafinil


  (Provigil)  100 mg  DAILY


 PO


   9/23/20 09:00


    9/25/20 08:22





 


 Non-Formulary


 Medication


  (** See Comment


 Field Below **)  SEE


 COMMENTS


 SECTION  1T@10


 XX


   9/3/20 10:00


 9/1/20 12:33 DC  





 


 Non-Formulary


 Medication


  (** See Comment


 Field Below **)  SEE


 COMMENTS


 SECTION  DAILY@1000


 XX


   9/5/20 10:00


 9/6/20 09:59 DC  





 


 Non-Formulary


 Medication


  (** See Comment


 Field Below **)  SEE LABEL


 COMMENTS  DAILY


 XX


   9/1/20 09:00


 9/1/20 13:42 DC  





 


 Non-Formulary


 Medication


  (** See Comment


 Field Below **)  SEE LABEL


 COMMENTS  DAILY


 XX


   9/2/20 09:00


 9/3/20 11:04 DC  





 


 Propranolol HCl


  (Inderal)  10 mg  TID


 PO


   8/22/20 21:00


    9/25/20 08:23





 


 Senna/Docusate


 Sodium


  (Senokot S)  1 tab  DAILY


 PO


   9/15/20 09:00


    9/23/20 08:20





 


 Trazodone HCl


  (Desyrel)  50 mg  QHSP  PRN


 PO


 INSOMNIA  8/22/20 18:30


   Cancel  





 


 Zolpidem Tartrate


  (Ambien)  10 mg  QHSP  PRN


 PO


 Insomnia  8/22/20 20:00


 8/29/20 19:59 DC 8/28/20 20:51





 


 Zolpidem Tartrate


  (Ambien)  10 mg  QHSP  PRN


 PO


 INSOMNIA  9/18/20 21:15


    9/24/20 20:58





 


 Zolpidem Tartrate


  (Ambien)  10 mg  QHSP  PRN


 PO


 Insomnia   9/4/20 23:00


 9/18/20 13:49 DC 9/16/20 21:13














Allergies


Coded Allergies:  


     No Known Allergies (Unverified , 8/22/20)











DEBRA MARQUEZ DO              Sep 25, 2020 10:17 unknown

## 2020-09-26 VITALS — SYSTOLIC BLOOD PRESSURE: 162 MMHG | DIASTOLIC BLOOD PRESSURE: 74 MMHG

## 2020-09-26 VITALS — SYSTOLIC BLOOD PRESSURE: 142 MMHG | DIASTOLIC BLOOD PRESSURE: 77 MMHG

## 2020-09-26 RX ADMIN — DOCUSATE SODIUM,SENNOSIDES SCH TAB: 50; 8.6 TABLET, FILM COATED ORAL at 09:00

## 2020-09-26 RX ADMIN — PROPRANOLOL HYDROCHLORIDE SCH MG: 10 TABLET ORAL at 20:59

## 2020-09-26 RX ADMIN — PROPRANOLOL HYDROCHLORIDE SCH MG: 10 TABLET ORAL at 17:19

## 2020-09-26 RX ADMIN — ATORVASTATIN CALCIUM SCH MG: 20 TABLET, FILM COATED ORAL at 09:04

## 2020-09-26 RX ADMIN — CLOZAPINE SCH MG: 25 TABLET ORAL at 20:55

## 2020-09-26 RX ADMIN — ARIPIPRAZOLE SCH MG: 15 TABLET ORAL at 20:56

## 2020-09-26 RX ADMIN — LEVOTHYROXINE SODIUM SCH MCG: 25 TABLET ORAL at 05:55

## 2020-09-26 RX ADMIN — DIPHENHYDRAMINE HYDROCHLORIDE PRN MG: 50 CAPSULE ORAL at 20:55

## 2020-09-26 RX ADMIN — MODAFINIL SCH MG: 100 TABLET ORAL at 09:03

## 2020-09-26 RX ADMIN — LOSARTAN POTASSIUM SCH MG: 25 TABLET, FILM COATED ORAL at 09:03

## 2020-09-26 RX ADMIN — METFORMIN HYDROCHLORIDE SCH MG: 500 TABLET, EXTENDED RELEASE ORAL at 08:59

## 2020-09-26 RX ADMIN — ARIPIPRAZOLE SCH MG: 15 TABLET ORAL at 09:04

## 2020-09-26 RX ADMIN — METFORMIN HYDROCHLORIDE SCH MG: 500 TABLET, EXTENDED RELEASE ORAL at 17:18

## 2020-09-26 RX ADMIN — CLOZAPINE SCH MG: 25 TABLET ORAL at 09:04

## 2020-09-26 RX ADMIN — PROPRANOLOL HYDROCHLORIDE SCH MG: 10 TABLET ORAL at 09:04

## 2020-09-27 VITALS — DIASTOLIC BLOOD PRESSURE: 93 MMHG | SYSTOLIC BLOOD PRESSURE: 153 MMHG

## 2020-09-27 VITALS — SYSTOLIC BLOOD PRESSURE: 121 MMHG | DIASTOLIC BLOOD PRESSURE: 70 MMHG

## 2020-09-27 VITALS — DIASTOLIC BLOOD PRESSURE: 83 MMHG | SYSTOLIC BLOOD PRESSURE: 144 MMHG

## 2020-09-27 RX ADMIN — IBUPROFEN PRN MG: 600 TABLET, FILM COATED ORAL at 08:54

## 2020-09-27 RX ADMIN — CLOZAPINE SCH MG: 25 TABLET ORAL at 21:26

## 2020-09-27 RX ADMIN — ATORVASTATIN CALCIUM SCH MG: 20 TABLET, FILM COATED ORAL at 08:56

## 2020-09-27 RX ADMIN — PROPRANOLOL HYDROCHLORIDE SCH MG: 10 TABLET ORAL at 21:26

## 2020-09-27 RX ADMIN — MODAFINIL SCH MG: 100 TABLET ORAL at 08:56

## 2020-09-27 RX ADMIN — ARIPIPRAZOLE SCH MG: 15 TABLET ORAL at 21:26

## 2020-09-27 RX ADMIN — PROPRANOLOL HYDROCHLORIDE SCH MG: 10 TABLET ORAL at 08:56

## 2020-09-27 RX ADMIN — DOCUSATE SODIUM,SENNOSIDES SCH TAB: 50; 8.6 TABLET, FILM COATED ORAL at 08:58

## 2020-09-27 RX ADMIN — METFORMIN HYDROCHLORIDE SCH MG: 500 TABLET, EXTENDED RELEASE ORAL at 17:58

## 2020-09-27 RX ADMIN — METFORMIN HYDROCHLORIDE SCH MG: 500 TABLET, EXTENDED RELEASE ORAL at 08:57

## 2020-09-27 RX ADMIN — PROPRANOLOL HYDROCHLORIDE SCH MG: 10 TABLET ORAL at 15:33

## 2020-09-27 RX ADMIN — CLOZAPINE SCH MG: 25 TABLET ORAL at 08:55

## 2020-09-27 RX ADMIN — LEVOTHYROXINE SODIUM SCH MCG: 25 TABLET ORAL at 05:50

## 2020-09-27 RX ADMIN — ARIPIPRAZOLE SCH MG: 15 TABLET ORAL at 08:56

## 2020-09-27 RX ADMIN — DIPHENHYDRAMINE HYDROCHLORIDE PRN MG: 50 CAPSULE ORAL at 21:26

## 2020-09-27 RX ADMIN — LOSARTAN POTASSIUM SCH MG: 25 TABLET, FILM COATED ORAL at 08:55

## 2020-09-28 VITALS — DIASTOLIC BLOOD PRESSURE: 89 MMHG | SYSTOLIC BLOOD PRESSURE: 136 MMHG

## 2020-09-28 VITALS — SYSTOLIC BLOOD PRESSURE: 127 MMHG | DIASTOLIC BLOOD PRESSURE: 80 MMHG

## 2020-09-28 RX ADMIN — PROPRANOLOL HYDROCHLORIDE SCH MG: 10 TABLET ORAL at 15:10

## 2020-09-28 RX ADMIN — PROPRANOLOL HYDROCHLORIDE SCH MG: 10 TABLET ORAL at 08:49

## 2020-09-28 RX ADMIN — ARIPIPRAZOLE SCH MG: 15 TABLET ORAL at 20:28

## 2020-09-28 RX ADMIN — MODAFINIL SCH MG: 100 TABLET ORAL at 08:49

## 2020-09-28 RX ADMIN — METFORMIN HYDROCHLORIDE SCH MG: 500 TABLET, EXTENDED RELEASE ORAL at 08:50

## 2020-09-28 RX ADMIN — ARIPIPRAZOLE SCH MG: 15 TABLET ORAL at 08:49

## 2020-09-28 RX ADMIN — METFORMIN HYDROCHLORIDE SCH MG: 500 TABLET, EXTENDED RELEASE ORAL at 17:08

## 2020-09-28 RX ADMIN — CLOZAPINE SCH MG: 25 TABLET ORAL at 20:28

## 2020-09-28 RX ADMIN — LOSARTAN POTASSIUM SCH MG: 25 TABLET, FILM COATED ORAL at 08:50

## 2020-09-28 RX ADMIN — CLOZAPINE SCH MG: 25 TABLET ORAL at 08:50

## 2020-09-28 RX ADMIN — PROPRANOLOL HYDROCHLORIDE SCH MG: 10 TABLET ORAL at 20:27

## 2020-09-28 RX ADMIN — ATORVASTATIN CALCIUM SCH MG: 20 TABLET, FILM COATED ORAL at 08:49

## 2020-09-28 RX ADMIN — LEVOTHYROXINE SODIUM SCH MCG: 25 TABLET ORAL at 06:21

## 2020-09-28 RX ADMIN — DOCUSATE SODIUM,SENNOSIDES SCH TAB: 50; 8.6 TABLET, FILM COATED ORAL at 08:50

## 2020-09-28 NOTE — MHIPNPDOC
[facility] Progress Note


Progress Note


DATE OF SERVICE: 9/28/20


Subjective


HPI:





Lowell presents today for a follow up. He denies any constipation, stomach 

issues, or fever.











MEDICATIONS:





He continues to take his prescribed medications regularly, including Clozaril.











SOCIAL HISTORY - LIVING SITUATION:





Quinn currently living at [place], reports having previous history of going to 

[facility] but reports he doesn't want to return as it is too chaotic. 





Objective


Appearance: Appears to be stated age. Well nourished. Hygiene fair. Well 

groomed.











Affect: Flattened affect.











Thought Form: Linear and goal directed.











Thought Content: No thoughts of self harm. No evidence of suicidal ideation. No 

evidence of delusions. No evidence of aggressive or homicidal ideation.











Judgement: Baseline.











Insight: Baseline.





Assessment


F20.9 Schizophrenia, unspecified





Plan


Plan is to continue Clozapine and Abilify along with other medications. Will see

about triaging back to outpatient as it appears that his improvement would 

likely preclude a successful hearing.





Will attempt to work with  to see if theres an amenable solution.





Vital Signs





Vital Signs








  Date Time  Temp Pulse Resp B/P (MAP) Pulse Ox O2 Delivery O2 Flow Rate FiO2


 


9/28/20 08:49  90  132/73    


 


9/28/20 06:29 97.3  16  97 Room Air  











Laboratory Data


24H Labs


Laboratory Tests 2


9/27/20 18:00: Bedside Glucose (Misc Panel) 151H


9/28/20 06:27: Bedside Glucose (Misc Panel) 109H





Current Medications





Current Medications








 Medications


  (Trade)  Dose


 Ordered  Sig/Heather


 Route


 PRN Reason  Start Time


 Stop Time Status Last Admin


Dose Admin


 


 Acetaminophen


  (Tylenol Tab)  650 mg  Q6HP  PRN


 PO


 HEADACHE or DISCOMFORT  8/22/20 18:30


   Cancel  





 


 Al Hydrox/Mg


 Hydrox/Simethicone


  (Mylanta)  30 ml  Q4HP  PRN


 PO


 HEARTBURN/INDIGESTION  8/22/20 18:30


     





 


 Aripiprazole


  (AbiLIFY)  15 mg  BID


 PO


   8/22/20 21:00


    9/28/20 08:49





 


 Atorvastatin


 Calcium


  (Lipitor)  20 mg  DAILY


 PO


   8/23/20 09:00


    9/28/20 08:49





 


 Clozapine


  (Clozaril)  12.5 mg  QHS


 PO


   9/8/20 21:00


 9/9/20 17:10 DC 9/8/20 21:57





 


 Clozapine


  (Clozaril)  25 mg  DAILY


 PO


   9/17/20 09:00


    9/28/20 08:50





 


 Clozapine


  (Clozaril)  25 mg  QHS


 PO


   9/9/20 21:00


 9/14/20 11:26 DC 9/13/20 20:57





 


 Clozapine


  (Clozaril)  50 mg  QHS


 PO


   9/14/20 21:00


    9/27/20 21:26





 


 Diphenhydramine


 HCl


  (Benadryl)  50 mg  TID  PRN


 PO


 ANXIETY/AGITATION  8/22/20 20:00


    9/27/20 21:26





 


 Haloperidol


  (Haldol)  20 mg  QHS


 PO


   8/22/20 21:00


    9/27/20 21:25





 


 Home Med


  (Med Rec


 Complete!)    ASDIRECTED


 XX


   8/22/20 19:00


 8/22/20 18:55 DC  





 


 Ibuprofen


  (Advil)  600 mg  TID  PRN


 PO


 PAIN  8/22/20 20:00


    9/27/20 08:54





 


 Levothyroxine


 Sodium


  (Synthroid)  25 mcg  DAILY@0600


 PO


   8/23/20 06:00


    9/28/20 06:21





 


 Losartan Potassium


  (Cozaar)  25 mg  DAILY


 PO


   8/23/20 09:00


    9/28/20 08:50





 


 Magnesium


 Hydroxide


  (Milk Of


 Magnesia)  30 ml  DAILYPRN  PRN


 PO


 CONSTIPATION  8/22/20 18:30


     





 


 Metformin HCl


  (Glucophage Xr)  500 mg  BID@0800,1800


 PO


   8/22/20 18:00


    9/28/20 08:50





 


 Miscellaneous


  (Unresolved


 Clarification


 Entry)  SEE LABEL


 COMMENTS  DAILY


 XX


   9/11/20 09:00


 9/11/20 14:36 DC  





 


 Miscellaneous


  (Unresolved


 Clarification


 Entry)  SEE LABEL


 COMMENTS  DAILY


 XX


   9/20/20 09:00


 9/21/20 15:47 DC  





 


 Miscellaneous


  (Unresolved


 Clarification


 Entry)  SEE LABEL


 COMMENTS  DAILY


 XX


   9/25/20 09:00


 9/28/20 08:52 DC  





 


 Modafinil


  (Provigil)  50 mg  DAILY


 PO


   9/17/20 09:00


 9/22/20 11:05 DC 9/22/20 08:32





 


 Modafinil


  (Provigil)  100 mg  DAILY


 PO


   9/23/20 09:00


    9/28/20 08:49





 


 Non-Formulary


 Medication


  (** See Comment


 Field Below **)  SEE


 COMMENTS


 SECTION  1T@10


 XX


   9/3/20 10:00


 9/1/20 12:33 DC  





 


 Non-Formulary


 Medication


  (** See Comment


 Field Below **)  SEE


 COMMENTS


 SECTION  DAILY@1000


 XX


   9/5/20 10:00


 9/6/20 09:59 DC  





 


 Non-Formulary


 Medication


  (** See Comment


 Field Below **)  SEE LABEL


 COMMENTS  DAILY


 XX


   9/1/20 09:00


 9/1/20 13:42 DC  





 


 Non-Formulary


 Medication


  (** See Comment


 Field Below **)  SEE LABEL


 COMMENTS  DAILY


 XX


   9/2/20 09:00


 9/3/20 11:04 DC  





 


 Propranolol HCl


  (Inderal)  10 mg  TID


 PO


   8/22/20 21:00


    9/28/20 08:49





 


 Senna/Docusate


 Sodium


  (Senokot S)  1 tab  DAILY


 PO


   9/15/20 09:00


    9/28/20 08:50





 


 Trazodone HCl


  (Desyrel)  50 mg  QHSP  PRN


 PO


 INSOMNIA  8/22/20 18:30


   Cancel  





 


 Zolpidem Tartrate


  (Ambien)  10 mg  QHSP  PRN


 PO


 Insomnia  8/22/20 20:00


 8/29/20 19:59 DC 8/28/20 20:51





 


 Zolpidem Tartrate


  (Ambien)  10 mg  QHSP  PRN


 PO


 INSOMNIA  9/18/20 21:15


    9/27/20 21:26





 


 Zolpidem Tartrate


  (Ambien)  10 mg  QHSP  PRN


 PO


 Insomnia   9/4/20 23:00


 9/18/20 13:49 DC 9/16/20 21:13














Allergies


Coded Allergies:  


     No Known Allergies (Unverified , 8/22/20)











DEBRA MARQUEZ DO              Sep 28, 2020 09:49

## 2020-09-29 VITALS — SYSTOLIC BLOOD PRESSURE: 143 MMHG | DIASTOLIC BLOOD PRESSURE: 79 MMHG

## 2020-09-29 VITALS — DIASTOLIC BLOOD PRESSURE: 78 MMHG | SYSTOLIC BLOOD PRESSURE: 108 MMHG

## 2020-09-29 RX ADMIN — PROPRANOLOL HYDROCHLORIDE SCH MG: 10 TABLET ORAL at 15:25

## 2020-09-29 RX ADMIN — CLOZAPINE SCH MG: 25 TABLET ORAL at 20:10

## 2020-09-29 RX ADMIN — ARIPIPRAZOLE SCH MG: 15 TABLET ORAL at 08:47

## 2020-09-29 RX ADMIN — LEVOTHYROXINE SODIUM SCH MCG: 25 TABLET ORAL at 06:12

## 2020-09-29 RX ADMIN — LOSARTAN POTASSIUM SCH MG: 25 TABLET, FILM COATED ORAL at 08:47

## 2020-09-29 RX ADMIN — ATORVASTATIN CALCIUM SCH MG: 20 TABLET, FILM COATED ORAL at 08:47

## 2020-09-29 RX ADMIN — ARIPIPRAZOLE SCH MG: 15 TABLET ORAL at 20:10

## 2020-09-29 RX ADMIN — CLOZAPINE SCH MG: 25 TABLET ORAL at 08:47

## 2020-09-29 RX ADMIN — METFORMIN HYDROCHLORIDE SCH MG: 500 TABLET, EXTENDED RELEASE ORAL at 17:13

## 2020-09-29 RX ADMIN — MODAFINIL SCH MG: 100 TABLET ORAL at 08:47

## 2020-09-29 RX ADMIN — PROPRANOLOL HYDROCHLORIDE SCH MG: 10 TABLET ORAL at 20:11

## 2020-09-29 RX ADMIN — PROPRANOLOL HYDROCHLORIDE SCH MG: 10 TABLET ORAL at 08:47

## 2020-09-29 RX ADMIN — DOCUSATE SODIUM,SENNOSIDES SCH TAB: 50; 8.6 TABLET, FILM COATED ORAL at 08:47

## 2020-09-29 RX ADMIN — METFORMIN HYDROCHLORIDE SCH MG: 500 TABLET, EXTENDED RELEASE ORAL at 08:47

## 2020-09-29 NOTE — MHIPNPDOC
[facility] Progress Note


Progress Note


DATE OF SERVICE: 9/29/20


Subjective


HPI:





Lowell presents today for concerns for being admitted to the inpatient mental 

health unit. He notes that he is interested in diet, lose weight, and exercise. 

[facility] made a lot of progress with Clozapine. He reports that he wants his 

medication to be more simplified. He denies any problems with constipation and 

any fevers. He states that he wants assisted living. He denies any suicidal or 

homicidal thoughts. He is also taking Modafinil and Clozaril. He reports that he

is otherwise doing well. He is still not interested in going to [facility].





Objective


Appearance: Hygiene improved. Appears to be stated age. Well nourished. Well 

groomed.











Affect: Appropriate to context. More reactive. Less thought blocking present. 

Full range.











Cognition: Grossly intact. Alert, Attentive, and Oriented to person, place, 

time.











Thought Form: More linear and logical.











Judgement: Improved. Intact as evidenced by decision making in the recent past.











Insight: Improved. Good insight into symptoms and treatment options.





Assessment


F20.9 Schizophrenia, unspecified





Plan


Continue clozapine 25/50 and Modafinil 100 milligrams daily.





Well get CBC with ANC and EKG to monitor if he is improving. Well attempt to 

meet without patient provider and  as the patient is unlikely to be 

accepted by [facility] given his improvement.





Vital Signs





Vital Signs








  Date Time  Temp Pulse Resp B/P (MAP) Pulse Ox O2 Delivery O2 Flow Rate FiO2


 


9/29/20 08:47  98      


 


9/29/20 08:47    121/81    


 


9/29/20 06:31 96.8  18   Room Air  


 


9/28/20 06:29     97   











Laboratory Data


24H Labs


Laboratory Tests 2


9/28/20 17:07: Bedside Glucose (Misc Panel) 120H


9/29/20 06:17: Bedside Glucose (Misc Panel) 132H





Current Medications





Current Medications








 Medications


  (Trade)  Dose


 Ordered  Sig/Heather


 Route


 PRN Reason  Start Time


 Stop Time Status Last Admin


Dose Admin


 


 Acetaminophen


  (Tylenol Tab)  650 mg  Q6HP  PRN


 PO


 HEADACHE or DISCOMFORT  8/22/20 18:30


   Cancel  





 


 Al Hydrox/Mg


 Hydrox/Simethicone


  (Mylanta)  30 ml  Q4HP  PRN


 PO


 HEARTBURN/INDIGESTION  8/22/20 18:30


     





 


 Aripiprazole


  (AbiLIFY)  15 mg  BID


 PO


   8/22/20 21:00


    9/29/20 08:47





 


 Atorvastatin


 Calcium


  (Lipitor)  20 mg  DAILY


 PO


   8/23/20 09:00


    9/29/20 08:47





 


 Clozapine


  (Clozaril)  12.5 mg  QHS


 PO


   9/8/20 21:00


 9/9/20 17:10 DC 9/8/20 21:57





 


 Clozapine


  (Clozaril)  25 mg  DAILY


 PO


   9/17/20 09:00


    9/29/20 08:47





 


 Clozapine


  (Clozaril)  25 mg  QHS


 PO


   9/9/20 21:00


 9/14/20 11:26 DC 9/13/20 20:57





 


 Clozapine


  (Clozaril)  50 mg  QHS


 PO


   9/14/20 21:00


    9/28/20 20:28





 


 Diphenhydramine


 HCl


  (Benadryl)  50 mg  TID  PRN


 PO


 ANXIETY/AGITATION  8/22/20 20:00


    9/27/20 21:26





 


 Haloperidol


  (Haldol)  20 mg  QHS


 PO


   8/22/20 21:00


    9/28/20 20:27





 


 Home Med


  (Med Rec


 Complete!)    ASDIRECTED


 XX


   8/22/20 19:00


 8/22/20 18:55 DC  





 


 Ibuprofen


  (Advil)  600 mg  TID  PRN


 PO


 PAIN  8/22/20 20:00


    9/27/20 08:54





 


 Levothyroxine


 Sodium


  (Synthroid)  25 mcg  DAILY@0600


 PO


   8/23/20 06:00


    9/29/20 06:12





 


 Losartan Potassium


  (Cozaar)  25 mg  DAILY


 PO


   8/23/20 09:00


    9/29/20 08:47





 


 Magnesium


 Hydroxide


  (Milk Of


 Magnesia)  30 ml  DAILYPRN  PRN


 PO


 CONSTIPATION  8/22/20 18:30


     





 


 Metformin HCl


  (Glucophage Xr)  500 mg  BID@0800,1800


 PO


   8/22/20 18:00


    9/29/20 08:47





 


 Miscellaneous


  (Unresolved


 Clarification


 Entry)  SEE LABEL


 COMMENTS  DAILY


 XX


   9/11/20 09:00


 9/11/20 14:36 DC  





 


 Miscellaneous


  (Unresolved


 Clarification


 Entry)  SEE LABEL


 COMMENTS  DAILY


 XX


   9/20/20 09:00


 9/21/20 15:47 DC  





 


 Miscellaneous


  (Unresolved


 Clarification


 Entry)  SEE LABEL


 COMMENTS  DAILY


 XX


   9/25/20 09:00


 9/28/20 08:52 DC  





 


 Miscellaneous


  (Unresolved


 Clarification


 Entry)  SEE LABEL


 COMMENTS  DAILY


 XX


   9/29/20 09:00


     





 


 Modafinil


  (Provigil)  50 mg  DAILY


 PO


   9/17/20 09:00


 9/22/20 11:05 DC 9/22/20 08:32





 


 Modafinil


  (Provigil)  100 mg  DAILY


 PO


   9/23/20 09:00


    9/29/20 08:47





 


 Non-Formulary


 Medication


  (** See Comment


 Field Below **)  SEE


 COMMENTS


 SECTION  1T@10


 XX


   9/3/20 10:00


 9/1/20 12:33 DC  





 


 Non-Formulary


 Medication


  (** See Comment


 Field Below **)  SEE


 COMMENTS


 SECTION  DAILY@1000


 XX


   9/5/20 10:00


 9/6/20 09:59 DC  





 


 Non-Formulary


 Medication


  (** See Comment


 Field Below **)  SEE LABEL


 COMMENTS  DAILY


 XX


   9/1/20 09:00


 9/1/20 13:42 DC  





 


 Non-Formulary


 Medication


  (** See Comment


 Field Below **)  SEE LABEL


 COMMENTS  DAILY


 XX


   9/2/20 09:00


 9/3/20 11:04 DC  





 


 Propranolol HCl


  (Inderal)  10 mg  TID


 PO


   8/22/20 21:00


    9/29/20 08:47





 


 Senna/Docusate


 Sodium


  (Senokot S)  1 tab  DAILY


 PO


   9/15/20 09:00


    9/29/20 08:47





 


 Trazodone HCl


  (Desyrel)  50 mg  QHSP  PRN


 PO


 INSOMNIA  8/22/20 18:30


   Cancel  





 


 Zolpidem Tartrate


  (Ambien)  10 mg  QHSP  PRN


 PO


 Insomnia  8/22/20 20:00


 8/29/20 19:59 DC 8/28/20 20:51





 


 Zolpidem Tartrate


  (Ambien)  10 mg  QHSP  PRN


 PO


 INSOMNIA  9/18/20 21:15


    9/28/20 20:27





 


 Zolpidem Tartrate


  (Ambien)  10 mg  QHSP  PRN


 PO


 Insomnia   9/4/20 23:00


 9/18/20 13:49 DC 9/16/20 21:13














Allergies


Coded Allergies:  


     No Known Allergies (Unverified , 8/22/20)











DEBRA MARQUEZ DO              Sep 29, 2020 10:17

## 2020-09-30 VITALS — SYSTOLIC BLOOD PRESSURE: 142 MMHG | DIASTOLIC BLOOD PRESSURE: 70 MMHG

## 2020-09-30 VITALS — SYSTOLIC BLOOD PRESSURE: 142 MMHG | DIASTOLIC BLOOD PRESSURE: 78 MMHG

## 2020-09-30 LAB
BASOPHILS # BLD AUTO: 0 10^3/UL (ref 0–0.2)
BASOPHILS NFR BLD AUTO: 0.3 % (ref 0–1)
EOSINOPHIL # BLD AUTO: 0.1 10^3/UL (ref 0–0.5)
EOSINOPHIL NFR BLD AUTO: 1.5 % (ref 0–3)
HCT VFR BLD AUTO: 44.1 % (ref 42–52)
HGB BLD-MCNC: 14.8 G/DL (ref 13.5–17.5)
LYMPHOCYTES # BLD AUTO: 2.6 10^3/UL (ref 1.5–5)
LYMPHOCYTES NFR BLD AUTO: 40.3 % (ref 24–44)
MCH RBC QN AUTO: 28.8 PG (ref 27–33)
MCHC RBC AUTO-ENTMCNC: 33.6 G/DL (ref 32–36.5)
MCV RBC AUTO: 85.8 FL (ref 80–96)
MONOCYTES # BLD AUTO: 0.7 10^3/UL (ref 0–0.8)
MONOCYTES NFR BLD AUTO: 10.2 % (ref 0–5)
NEUTROPHILS # BLD AUTO: 3.1 10^3/UL (ref 1.5–8.5)
NEUTROPHILS NFR BLD AUTO: 47.4 % (ref 36–66)
PLATELET # BLD AUTO: 187 10^3/UL (ref 150–450)
RBC # BLD AUTO: 5.14 10^6/UL (ref 4.3–6.1)
WBC # BLD AUTO: 6.5 10^3/UL (ref 4–10)

## 2020-09-30 RX ADMIN — DOCUSATE SODIUM,SENNOSIDES SCH TAB: 50; 8.6 TABLET, FILM COATED ORAL at 08:08

## 2020-09-30 RX ADMIN — ARIPIPRAZOLE SCH MG: 15 TABLET ORAL at 08:09

## 2020-09-30 RX ADMIN — IBUPROFEN PRN MG: 600 TABLET, FILM COATED ORAL at 08:09

## 2020-09-30 RX ADMIN — PROPRANOLOL HYDROCHLORIDE SCH MG: 10 TABLET ORAL at 08:10

## 2020-09-30 RX ADMIN — CLOZAPINE SCH MG: 25 TABLET ORAL at 08:09

## 2020-09-30 RX ADMIN — LOSARTAN POTASSIUM SCH MG: 25 TABLET, FILM COATED ORAL at 08:09

## 2020-09-30 RX ADMIN — METFORMIN HYDROCHLORIDE SCH MG: 500 TABLET, EXTENDED RELEASE ORAL at 08:08

## 2020-09-30 RX ADMIN — MODAFINIL SCH MG: 100 TABLET ORAL at 08:09

## 2020-09-30 RX ADMIN — ATORVASTATIN CALCIUM SCH MG: 20 TABLET, FILM COATED ORAL at 08:10

## 2020-09-30 RX ADMIN — LEVOTHYROXINE SODIUM SCH MCG: 25 TABLET ORAL at 06:13

## 2020-09-30 NOTE — MHIPNPDOC
[facility] Progress Note


Progress Note


DATE OF SERVICE: 9/30/20





HISTORY: .





VITAL SIGNS: See below.





NEW TEST RESULTS: .





CURRENT MEDICATIONS: See below.





MENTAL STATUS EXAMINATION:


Patient is a -year old male, who is .


Speech: Is .


Language skills are .


Thought processes including: .


Thought content: . Abstract reasoning, and computation: . Description of 

associations: .


Description of abnormal or psychotic thoughts: .


Judgment: .


Insight: [very limited, good, fair. poor].


Orientation: .


Recent and remote memory: .


Attention span and concentration: .


Language: .


Fund of knowledge: .


Mood: . Affect: .





DIAGNOSES:


1. .


2. .


3. .


 


ASSESSMENT:





MANAGEMENT PLAN: .





TIME SPENT:  minutes.





Vital Signs





Vital Signs








  Date Time  Temp Pulse Resp B/P (MAP) Pulse Ox O2 Delivery O2 Flow Rate FiO2


 


9/30/20 08:10  89      


 


9/30/20 08:09    142/70    


 


9/30/20 06:29 96.9  18     


 


9/29/20 06:31      Room Air  


 


9/28/20 06:29     97   











Laboratory Data


24H Labs


Laboratory Tests 2


9/29/20 17:12: Bedside Glucose (Misc Panel) 126H


9/30/20 06:07: Bedside Glucose (Misc Panel) 123H


9/30/20 07:22: 


Immature Granulocyte % (Auto) 0.3, Neutrophils (%) (Auto) 47.4, Lymphocytes (%) 

(Auto) 40.3, Monocytes (%) (Auto) 10.2H, Eosinophils (%) (Auto) 1.5, Basophils 

(%) (Auto) 0.3, Neutrophils # (Auto) 3.1, Lymphocytes # (Auto) 2.6, Monocytes # 

(Auto) 0.7, Eosinophils # (Auto) 0.1, Basophils # (Auto) 0.0, Nucleated Red 

Blood Cells % (auto) 0.0


CBC/BMP


Laboratory Tests


9/30/20 07:22











Current Medications





Current Medications








 Medications


  (Trade)  Dose


 Ordered  Sig/Heather


 Route


 PRN Reason  Start Time


 Stop Time Status Last Admin


Dose Admin


 


 Acetaminophen


  (Tylenol Tab)  650 mg  Q6HP  PRN


 PO


 HEADACHE or DISCOMFORT  8/22/20 18:30


   Cancel  





 


 Al Hydrox/Mg


 Hydrox/Simethicone


  (Mylanta)  30 ml  Q4HP  PRN


 PO


 HEARTBURN/INDIGESTION  8/22/20 18:30


     





 


 Aripiprazole


  (AbiLIFY)  15 mg  BID


 PO


   8/22/20 21:00


    9/30/20 08:09





 


 Atorvastatin


 Calcium


  (Lipitor)  20 mg  DAILY


 PO


   8/23/20 09:00


    9/30/20 08:10





 


 Clozapine


  (Clozaril)  12.5 mg  QHS


 PO


   9/8/20 21:00


 9/9/20 17:10 DC 9/8/20 21:57





 


 Clozapine


  (Clozaril)  25 mg  DAILY


 PO


   9/17/20 09:00


    9/30/20 08:09





 


 Clozapine


  (Clozaril)  25 mg  QHS


 PO


   9/9/20 21:00


 9/14/20 11:26 DC 9/13/20 20:57





 


 Clozapine


  (Clozaril)  50 mg  QHS


 PO


   9/14/20 21:00


    9/29/20 20:10





 


 Diphenhydramine


 HCl


  (Benadryl)  50 mg  TID  PRN


 PO


 ANXIETY/AGITATION  8/22/20 20:00


    9/27/20 21:26





 


 Haloperidol


  (Haldol)  20 mg  QHS


 PO


   8/22/20 21:00


    9/29/20 20:10





 


 Home Med


  (Med Rec


 Complete!)    ASDIRECTED


 XX


   8/22/20 19:00


 8/22/20 18:55 DC  





 


 Ibuprofen


  (Advil)  600 mg  TID  PRN


 PO


 PAIN  8/22/20 20:00


    9/30/20 08:09





 


 Levothyroxine


 Sodium


  (Synthroid)  25 mcg  DAILY@0600


 PO


   8/23/20 06:00


    9/30/20 06:13





 


 Losartan Potassium


  (Cozaar)  25 mg  DAILY


 PO


   8/23/20 09:00


    9/30/20 08:09





 


 Magnesium


 Hydroxide


  (Milk Of


 Magnesia)  30 ml  DAILYPRN  PRN


 PO


 CONSTIPATION  8/22/20 18:30


     





 


 Metformin HCl


  (Glucophage Xr)  500 mg  BID@0800,1800


 PO


   8/22/20 18:00


    9/30/20 08:08





 


 Miscellaneous


  (Unresolved


 Clarification


 Entry)  SEE LABEL


 COMMENTS  DAILY


 XX


   9/11/20 09:00


 9/11/20 14:36 DC  





 


 Miscellaneous


  (Unresolved


 Clarification


 Entry)  SEE LABEL


 COMMENTS  DAILY


 XX


   9/20/20 09:00


 9/21/20 15:47 DC  





 


 Miscellaneous


  (Unresolved


 Clarification


 Entry)  SEE LABEL


 COMMENTS  DAILY


 XX


   9/25/20 09:00


 9/28/20 08:52 DC  





 


 Miscellaneous


  (Unresolved


 Clarification


 Entry)  SEE LABEL


 COMMENTS  DAILY


 XX


   9/29/20 09:00


 9/29/20 12:48 DC  





 


 Modafinil


  (Provigil)  50 mg  DAILY


 PO


   9/17/20 09:00


 9/22/20 11:05 DC 9/22/20 08:32





 


 Modafinil


  (Provigil)  100 mg  DAILY


 PO


   9/23/20 09:00


    9/30/20 08:09





 


 Non-Formulary


 Medication


  (** See Comment


 Field Below **)  SEE


 COMMENTS


 SECTION  1T@10


 XX


   9/3/20 10:00


 9/1/20 12:33 DC  





 


 Non-Formulary


 Medication


  (** See Comment


 Field Below **)  SEE


 COMMENTS


 SECTION  DAILY@1000


 XX


   9/5/20 10:00


 9/6/20 09:59 DC  





 


 Non-Formulary


 Medication


  (** See Comment


 Field Below **)  SEE LABEL


 COMMENTS  DAILY


 XX


   9/1/20 09:00


 9/1/20 13:42 DC  





 


 Non-Formulary


 Medication


  (** See Comment


 Field Below **)  SEE LABEL


 COMMENTS  DAILY


 XX


   9/2/20 09:00


 9/3/20 11:04 DC  





 


 Propranolol HCl


  (Inderal)  10 mg  TID


 PO


   8/22/20 21:00


    9/30/20 08:10





 


 Senna/Docusate


 Sodium


  (Senokot S)  1 tab  DAILY


 PO


   9/15/20 09:00


    9/30/20 08:08





 


 Trazodone HCl


  (Desyrel)  50 mg  QHSP  PRN


 PO


 INSOMNIA  8/22/20 18:30


   Cancel  





 


 Zolpidem Tartrate


  (Ambien)  10 mg  QHSP  PRN


 PO


 Insomnia  8/22/20 20:00


 8/29/20 19:59 DC 8/28/20 20:51





 


 Zolpidem Tartrate


  (Ambien)  10 mg  QHSP  PRN


 PO


 INSOMNIA  9/18/20 21:15


    9/29/20 20:10





 


 Zolpidem Tartrate


  (Ambien)  10 mg  QHSP  PRN


 PO


 Insomnia   9/4/20 23:00


 9/18/20 13:49 DC 9/16/20 21:13














Allergies


Coded Allergies:  


     No Known Allergies (Unverified , 8/22/20)











DEBRA MARQUEZ DO              Sep 30, 2020 09:06

## 2020-10-02 NOTE — MHDSPDOC
[facility] Discharge Summary


Discharge Summary


DATE OF ADMISSION: Aug 22, 2020 at 18:26 


DATE OF DISCHARGE: Sep 30, 2020 at 14:30








DISCHARGE DIAGNOSES:





F20.9 Schizophrenia, unspecified














CONSULTANTS INVOLVED:[ None (basic hospitalist screening)]





REASON FOR ADMISSION & TREATMENT AND PROGRESS ON THE UNIT : 





admitted to the inpatient mental healthy unit after notably being psychotic and 

his baseline unable to be determined at [facility]. He was 

transferred to our unit, where he was notably psychotic and had difficulties 

with responding to unseen others and even holding small sessions of court with a

completely invisible jury. He did well as cbcs, routinely done over several 

weeks, were all within normal. His EKGs indicated no significant distortion of 

OTC, and he generally tolerates the medications well. Hes put on Shelley 

prophylaxis of senna and did not have any problems with constipation. He 

generally began to take care of himself better, wears better clothes, and his 

hygiene improved greatly since the start of Clozapine. There was a meeting 

between his  and  before the discharge, of which they

informed him that they ultimately wanted to him in a group house, and he was 

amenable to this. And they discussed his difficulties with eating and the need 

to follow-up.











MEDICATIONS:





He initially was resumed on Abilify and other medications, such as Zolpidem, and

remained at baseline level of psychosis. He was started on Clozapine initially 

12.5 mg nightly, increased to a total of 25/50 QAM/QHS, respectively, of which 

he made huge progress. He became more organized, improved hygiene, stopped 

responding to internal stimulation, and reported that his voices has had abated 

and were nearly gone.





Continue with Clozapine and start on Modafinil for sleepiness during the day, 

increase to 100 mg as he did quite well on this, was notably more 

engaged/wakeful, and even going to some groups.





He was sent standing blood orders to be done for his Clozapine and intentions 

for him to continue as an outpatient.





DISCHARGE ASSESSMENT[improved]





Legal status considerations:





The patient at the time of discharge did not meet criteria for involuntary 

admission/extension due to having a improved mental status exam, improved 

insight into the situation, They are engaged in the discharge process, as well 

as being friendly and amenable in behavioral control and havent been engaging 

in any observed concerning behavior or ideation recently. They decline voluntary

 extension/admission at this time and must be discharged in good william, as Im 

unable to make a case for holding the patient against their will. They may have 

historical risk factors of admissions and other interactions with psychiatry 

however, those are not modifiable from a clinical perspective. The patient will 

need to be discharged in good william.





MENTAL STATUS EXAMINATION ON DISCHARGE: 





Appearance: Fair hygiene.











Affect: Somewhat flat, but much more improved and more euthymic. Hes able to 

smile now.











Speech: Normal rate. Normal volume. He denies any auditory and visual 

hallucinations at this time. He does not appear to be responding to interal 

stimuli. Spontaneous and Fluid.











Cognition: Alert, Attentive, and Oriented to person, place, time. Grossly inta

ct.











Thought Form: Linear and goal directed. Generally logical.











Thought Content: No evidence of suicidal ideation. No evidence of aggressive or 

homicidal ideation. No evidence of delusions. No thoughts of self harm.











Judgement: Intact as evidenced by decision making in the recent past.











Insight: Good insight into symptoms and treatment options.








PLAN/FOLLOWUP ARRANGEMENTS: Follow up appointments made (PCP and MH in 5 days of

 D/C date) and safety plan completed. 





Safety Planning aspects completed prior to discharge





[Medication supplies limited to 7 days with 4 refills to prevent accumulation to

 OD]


Housing/ and discussion


[RN reviewed crisis hotline information and other aspects to empower patient to 

access care in interim before next appointment.]











The amount of time spent in the coordination of care for this patient was 

approximately 30 minutes.





Vital Signs/I&Os





Vital Signs








  Date Time  Temp Pulse Resp B/P (MAP) Pulse Ox O2 Delivery O2 Flow Rate FiO2


 


9/30/20 08:10  89      


 


9/30/20 08:09    142/70    


 


9/30/20 06:29 96.9  18     


 


9/29/20 06:31      Room Air  


 


9/28/20 06:29     97   











Medications


Scheduled


Aripiprazole (Aripiprazole) 15 Mg Tablet, 15 MG PO BID, (Reported)


Atorvastatin Calcium (Atorvastatin Calcium) 20 Mg Tablet, 20 MG PO DAILY, 

(Reported)


Clozapine (Clozapine) 25 Mg Tablet, 1 TAB PO BID for mood for 7 Days, #21


   take 1 tab daily and 2 at night 


Haloperidol (Haloperidol) 20 Mg Tablet, 20 MG PO QHS, (Reported)


Levothyroxine Sodium (Levothyroxine Sodium) 25 Mcg Tablet, 25 MCG PO DAILY, 

(Reported)


Losartan Potassium (Losartan Potassium) 25 Mg Tablet, 25 MG PO DAILY, (Reported)


Metformin HCl (Metformin HCl ER) 500 Mg Tab.er.24h, 500 MG PO BID, (Reported)


Modafinil (Modafinil) 100 Mg Tablet, 100 MG PO DAILY for wakeful for 30 Days, 

#30


Propranolol HCl (Propranolol HCl) 10 Mg Tablet, 10 MG PO TID, (Reported)


Sennosides/Docusate Sodium (Senna Plus Tablet) 1 Each Tablet, 1 TAB PO DAILY for

bowl  for 30 Days, #30





Scheduled PRN


Diphenhydramine HCl (Diphenhydramine HCl) 50 Mg Capsule, 50 MG PO TID PRN for 

ANXIETY/AGITATION, (Reported)


Ibuprofen (Ibuprofen) 600 Mg Tablet, 1 MG PO TID PRN for PAIN, (Reported)


Zolpidem Tartrate (Ambien) 10 Mg Tablet, 10 MG PO QHS PRN for sleep for 30 Days,

#30





Miscellaneous Medications


[other]  , 1 for lab


   standing order for CBC with differential qweekly 





Allergies


Coded Allergies:  


     No Known Allergies (Unverified , 8/22/20)











DEBRA MARQUEZ DO               Oct 2, 2020 11:29

## 2020-10-04 NOTE — ECGEPIP
[facility]

                                       

                                       Test Date:    2020

Pat Name:     GALINA CHAPMAN            Department:   

Patient ID:   [id number]                 Room:         [place] [address]

Gender:       Male                     Technician:   SHON

:          1970               Requested By: DEBRA MARQUEZ 

Order Number: [id number]     Reading MD:   Wilfrido Hobbs

                                 Measurements

Intervals                              Axis          

Rate:         65                       P:            15

TX:           159                      QRS:          62

QRSD:         120                      T:            48

QT:           373                                    

QTc:          390                                    

                           Interpretive Statements

SINUS RHYTHM

POSSIBLE RIGHT VENTRICULAR CONDUCTION DELAY

Compared to prior tracings in the system, no significant changes

Electronically Signed on 10-4-2020 10:38:36 EDT by Wilfrido Hobbs

## 2024-04-24 NOTE — MHIPNPDOC
I have a bilateral mammogram and bilateral ultrasound ordered, see if she can come in at 830 tomorrow put her on the schedule - we can do an exam and evaluate further.    [facility] Progress Note


Progress Note


Subjective


HPI:


Lowell is met with today and is still distorted and psychotic without any further

insight. Patient is foundling in his bedroom. He is giving minimal answers.


Objective


Appearance: Poor hygiene.





Speech: Minimal speech.





Thought Form: Thought process is tangential. Tangential thought process.





Judgement: Poor judgement.





Insight: Poor insight.


Assessment


F20.9 Schizophrenia, unspecified


Plan


Continue patients current medications.


Continue to pursue the group house option as it appears that this would be the 

safest discharge for him.


Still concerned about changing medications too quickly as this could participate

a relapse.





Vital Signs





Vital Signs








  Date Time  Temp Pulse Resp B/P (MAP) Pulse Ox O2 Delivery O2 Flow Rate FiO2


 


8/31/20 06:24 99.0 88 18 138/68 (91)    


 


8/30/20 18:25     95 Room Air  











Current Medications





Current Medications








 Medications


  (Trade)  Dose


 Ordered  Sig/Heather


 Route


 PRN Reason  Start Time


 Stop Time Status Last Admin


Dose Admin


 


 Acetaminophen


  (Tylenol Tab)  650 mg  Q6HP  PRN


 PO


 HEADACHE or DISCOMFORT  8/22/20 18:30


   Cancel  





 


 Al Hydrox/Mg


 Hydrox/Simethicone


  (Mylanta)  30 ml  Q4HP  PRN


 PO


 HEARTBURN/INDIGESTION  8/22/20 18:30


     





 


 Aripiprazole


  (AbiLIFY)  15 mg  BID


 PO


   8/22/20 21:00


    8/30/20 21:31





 


 Atorvastatin


 Calcium


  (Lipitor)  20 mg  DAILY


 PO


   8/23/20 09:00


    8/30/20 08:22





 


 Diphenhydramine


 HCl


  (Benadryl)  50 mg  TID  PRN


 PO


 ANXIETY/AGITATION  8/22/20 20:00


    8/29/20 21:50





 


 Haloperidol


  (Haldol)  20 mg  QHS


 PO


   8/22/20 21:00


    8/30/20 21:32





 


 Home Med


  (Med Rec


 Complete!)    ASDIRECTED


 XX


   8/22/20 19:00


 8/22/20 18:55 DC  





 


 Ibuprofen


  (Advil)  600 mg  TID  PRN


 PO


 PAIN  8/22/20 20:00


    8/23/20 07:34





 


 Levothyroxine


 Sodium


  (Synthroid)  25 mcg  DAILY@0600


 PO


   8/23/20 06:00


    8/31/20 06:11





 


 Losartan Potassium


  (Cozaar)  25 mg  DAILY


 PO


   8/23/20 09:00


    8/30/20 08:21





 


 Magnesium


 Hydroxide


  (Milk Of


 Magnesia)  30 ml  DAILYPRN  PRN


 PO


 CONSTIPATION  8/22/20 18:30


     





 


 Metformin HCl


  (Glucophage Xr)  500 mg  BID@0800,1800


 PO


   8/22/20 18:00


    8/30/20 16:48





 


 Propranolol HCl


  (Inderal)  10 mg  TID


 PO


   8/22/20 21:00


    8/30/20 21:32





 


 Trazodone HCl


  (Desyrel)  50 mg  QHSP  PRN


 PO


 INSOMNIA  8/22/20 18:30


   Cancel  





 


 Zolpidem Tartrate


  (Ambien)  10 mg  QHSP  PRN


 PO


 Insomnia  8/22/20 20:00


 8/29/20 19:59 DC 8/28/20 20:51














Allergies


Coded Allergies:  


     No Known Allergies (Unverified , 8/22/20)











DEBRA MARQUEZ DO              Aug 31, 2020 07:42